# Patient Record
Sex: FEMALE | URBAN - NONMETROPOLITAN AREA
[De-identification: names, ages, dates, MRNs, and addresses within clinical notes are randomized per-mention and may not be internally consistent; named-entity substitution may affect disease eponyms.]

---

## 2018-08-08 ENCOUNTER — OFFICE VISIT (OUTPATIENT)
Dept: URGENT CARE | Age: 68
End: 2018-08-08
Payer: MEDICARE

## 2018-08-08 VITALS
TEMPERATURE: 97.4 F | HEIGHT: 64 IN | OXYGEN SATURATION: 95 % | RESPIRATION RATE: 20 BRPM | BODY MASS INDEX: 28.51 KG/M2 | WEIGHT: 167 LBS | HEART RATE: 71 BPM | SYSTOLIC BLOOD PRESSURE: 168 MMHG | DIASTOLIC BLOOD PRESSURE: 71 MMHG

## 2018-08-08 DIAGNOSIS — L02.413 CUTANEOUS ABSCESS OF RIGHT UPPER EXTREMITY: Primary | ICD-10-CM

## 2018-08-08 PROCEDURE — 4040F PNEUMOC VAC/ADMIN/RCVD: CPT | Performed by: PHYSICIAN ASSISTANT

## 2018-08-08 PROCEDURE — 1123F ACP DISCUSS/DSCN MKR DOCD: CPT | Performed by: PHYSICIAN ASSISTANT

## 2018-08-08 PROCEDURE — 4004F PT TOBACCO SCREEN RCVD TLK: CPT | Performed by: PHYSICIAN ASSISTANT

## 2018-08-08 PROCEDURE — G8419 CALC BMI OUT NRM PARAM NOF/U: HCPCS | Performed by: PHYSICIAN ASSISTANT

## 2018-08-08 PROCEDURE — 1090F PRES/ABSN URINE INCON ASSESS: CPT | Performed by: PHYSICIAN ASSISTANT

## 2018-08-08 PROCEDURE — G8427 DOCREV CUR MEDS BY ELIG CLIN: HCPCS | Performed by: PHYSICIAN ASSISTANT

## 2018-08-08 PROCEDURE — 1101F PT FALLS ASSESS-DOCD LE1/YR: CPT | Performed by: PHYSICIAN ASSISTANT

## 2018-08-08 PROCEDURE — G8400 PT W/DXA NO RESULTS DOC: HCPCS | Performed by: PHYSICIAN ASSISTANT

## 2018-08-08 PROCEDURE — 3017F COLORECTAL CA SCREEN DOC REV: CPT | Performed by: PHYSICIAN ASSISTANT

## 2018-08-08 PROCEDURE — 99213 OFFICE O/P EST LOW 20 MIN: CPT | Performed by: PHYSICIAN ASSISTANT

## 2018-08-08 RX ORDER — CEPHALEXIN 500 MG/1
500 CAPSULE ORAL 2 TIMES DAILY
Qty: 20 CAPSULE | Refills: 0 | Status: SHIPPED | OUTPATIENT
Start: 2018-08-08 | End: 2018-08-18

## 2018-08-08 RX ORDER — OMEPRAZOLE 40 MG/1
40 CAPSULE, DELAYED RELEASE ORAL
COMMUNITY
Start: 2018-06-27

## 2018-08-08 NOTE — PROGRESS NOTES
Subjective:      Patient ID: Dario Pope is a 77 y.o. female. HPI    [place] Keon presents today with a possible insect bite on posterior right shoulder. First noticed 2 days ago. Yesterday was draining. No fever noted. Area is irritated. No other associated symptoms. Applied Neosporin first day that it was noticed. No medications since. Review of Systems   Constitutional: Negative for chills and fever. Skin: Positive for wound. All other systems reviewed and are negative. Objective:   Physical Exam   Constitutional: She is oriented to person, place, and time. She appears well-developed and well-nourished. No distress. HENT:   Head: Normocephalic and atraumatic. Eyes: Right eye exhibits no discharge. Left eye exhibits no discharge. Neck: Normal range of motion. Neck supple. Pulmonary/Chest: Effort normal. No respiratory distress. Lymphadenopathy:     She has no cervical adenopathy. Neurological: She is alert and oriented to person, place, and time. Skin: Skin is warm and dry. She is not diaphoretic. Approximately 1.5 cm pustule to posterior right upper arm. No drainage noted. Non-fluctuant   Psychiatric: She has a normal mood and affect. Her behavior is normal. Judgment and thought content normal.   Nursing note and vitals reviewed. Assessment:      Skin Abscess      Plan:      - Start Keflex today. Take 1 tablet by mouth twice daily for 10 days.   - May do warm soaks.   - Notify clinic with any change in or worsening of symptoms.   - Return as needed.            Rowan Altamirano PA-C

## 2018-09-16 ENCOUNTER — OFFICE VISIT (OUTPATIENT)
Dept: URGENT CARE | Age: 68
End: 2018-09-16
Payer: MEDICARE

## 2018-09-16 VITALS
SYSTOLIC BLOOD PRESSURE: 146 MMHG | HEART RATE: 72 BPM | WEIGHT: 161 LBS | DIASTOLIC BLOOD PRESSURE: 72 MMHG | HEIGHT: 64 IN | BODY MASS INDEX: 27.49 KG/M2 | RESPIRATION RATE: 18 BRPM | TEMPERATURE: 97.5 F | OXYGEN SATURATION: 94 %

## 2018-09-16 DIAGNOSIS — N30.90 CYSTITIS: ICD-10-CM

## 2018-09-16 DIAGNOSIS — R30.0 DYSURIA: Primary | ICD-10-CM

## 2018-09-16 DIAGNOSIS — B37.31 YEAST VAGINITIS: ICD-10-CM

## 2018-09-16 LAB
APPEARANCE FLUID: ABNORMAL
BILIRUBIN, POC: ABNORMAL
BLOOD URINE, POC: ABNORMAL
CLARITY, POC: ABNORMAL
COLOR, POC: ABNORMAL
GLUCOSE URINE, POC: NEGATIVE
KETONES, POC: ABNORMAL
LEUKOCYTE EST, POC: ABNORMAL
NITRITE, POC: NEGATIVE
PH, POC: 5.5
PROTEIN, POC: ABNORMAL
SPECIFIC GRAVITY, POC: 1.02
UROBILINOGEN, POC: ABNORMAL

## 2018-09-16 PROCEDURE — 1101F PT FALLS ASSESS-DOCD LE1/YR: CPT | Performed by: FAMILY MEDICINE

## 2018-09-16 PROCEDURE — 1123F ACP DISCUSS/DSCN MKR DOCD: CPT | Performed by: FAMILY MEDICINE

## 2018-09-16 PROCEDURE — G8400 PT W/DXA NO RESULTS DOC: HCPCS | Performed by: FAMILY MEDICINE

## 2018-09-16 PROCEDURE — G8419 CALC BMI OUT NRM PARAM NOF/U: HCPCS | Performed by: FAMILY MEDICINE

## 2018-09-16 PROCEDURE — 81002 URINALYSIS NONAUTO W/O SCOPE: CPT | Performed by: FAMILY MEDICINE

## 2018-09-16 PROCEDURE — 4004F PT TOBACCO SCREEN RCVD TLK: CPT | Performed by: FAMILY MEDICINE

## 2018-09-16 PROCEDURE — G8427 DOCREV CUR MEDS BY ELIG CLIN: HCPCS | Performed by: FAMILY MEDICINE

## 2018-09-16 PROCEDURE — 4040F PNEUMOC VAC/ADMIN/RCVD: CPT | Performed by: FAMILY MEDICINE

## 2018-09-16 PROCEDURE — 99213 OFFICE O/P EST LOW 20 MIN: CPT | Performed by: FAMILY MEDICINE

## 2018-09-16 PROCEDURE — 3017F COLORECTAL CA SCREEN DOC REV: CPT | Performed by: FAMILY MEDICINE

## 2018-09-16 PROCEDURE — 1090F PRES/ABSN URINE INCON ASSESS: CPT | Performed by: FAMILY MEDICINE

## 2018-09-16 RX ORDER — FLUCONAZOLE 100 MG/1
100 TABLET ORAL DAILY
Qty: 7 TABLET | Refills: 0 | Status: SHIPPED | OUTPATIENT
Start: 2018-09-16 | End: 2018-09-23

## 2018-09-16 RX ORDER — SULFAMETHOXAZOLE AND TRIMETHOPRIM 800; 160 MG/1; MG/1
1 TABLET ORAL 2 TIMES DAILY
Qty: 20 TABLET | Refills: 0 | Status: SHIPPED | OUTPATIENT
Start: 2018-09-16 | End: 2018-09-26

## 2018-09-16 ASSESSMENT — ENCOUNTER SYMPTOMS: NAUSEA: 0

## 2018-09-16 NOTE — PROGRESS NOTES
[address] [facility]  [address] [facility] [place] [address]  Dept: [phone]  Loc: [phone]    Farida Li is a 77 y.o. female who presents today for her medical conditions/complaints as noted below. Farida Li is c/o of Urinary Frequency and Dysuria        HPI:     Urinary Frequency    This is a new problem. The current episode started yesterday. The problem has been rapidly worsening. There has been no fever. Associated symptoms include frequency, hesitancy and urgency. Pertinent negatives include no discharge, flank pain, hematuria, nausea or possible pregnancy. Her past medical history is significant for recurrent UTIs. Dysuria    This is a new problem. The current episode started yesterday. The problem has been rapidly worsening. Associated symptoms include frequency, hesitancy and urgency. Pertinent negatives include no discharge, flank pain, hematuria, nausea or possible pregnancy. Her past medical history is significant for recurrent UTIs. Past Medical History:   Diagnosis Date    Diabetes mellitus ([address]     Hyperlipidemia     Hypertension       Past Surgical History:   Procedure Laterality Date    CYST REMOVAL Right     HYSTERECTOMY         History reviewed. No pertinent family history.     Social History   Substance Use Topics    Smoking status: Current Every Day Smoker     Packs/day: 0.50     Types: Cigarettes    Smokeless tobacco: Never Used    Alcohol use No      Current Outpatient Prescriptions   Medication Sig Dispense Refill    fluconazole (DIFLUCAN) 100 MG tablet Take 1 tablet by mouth daily for 7 days 7 tablet 0    sulfamethoxazole-trimethoprim (BACTRIM DS) 800-160 MG per tablet Take 1 tablet by mouth 2 times daily for 10 days 20 tablet 0    Dulaglutide (TRULICITY) 7.60 BP/9.3PA SOPN Inject into the skin      Valsartan (DIOVAN PO) Diovan      omeprazole (PRILOSEC) 40 MG delayed release capsule Take 40 mg by mouth      hydrALAZINE (APRESOLINE) 10 MG tablet Take 10 mg by mouth 3 times daily      metoprolol (LOPRESSOR) 100 MG tablet Take 100 mg by mouth 2 times daily      GLYBURIDE PO Take by mouth      meclizine (ANTIVERT) 12.5 MG tablet Take 12.5 mg by mouth 3 times daily as needed      HYDROcodone-acetaminophen (NORCO)  MG per tablet Take 1 tablet by mouth every 6 hours as needed for Pain      furosemide (LASIX) 40 MG tablet Take 40 mg by mouth 2 times daily      phenazopyridine (PYRIDIUM) 200 MG tablet Take 1 tablet by mouth 3 times daily as needed for Pain 6 tablet 0     No current facility-administered medications for this visit. Allergies   Allergen Reactions    Codeine     Morphine     Paxil [Paroxetine Hcl]     Propoxyphene        Health Maintenance   Topic Date Due    Potassium monitoring  1950    Creatinine monitoring  1950    Hepatitis C screen  1950    DTaP/Tdap/Td vaccine (1 - Tdap) 05/26/1967    Lipid screen  05/26/1988    Diabetes screen  05/26/1988    Breast cancer screen  05/26/1998    Shingles Vaccine (1 of 2 - 2 Dose Series) 05/26/1998    Colon cancer screen colonoscopy  05/26/1998    DEXA (modify frequency per FRAX score)  05/26/2013    Pneumococcal low/med risk (2 of 2 - PPSV23) 11/17/2016    Flu vaccine (1) 09/01/2018       Subjective:      Review of Systems   Gastrointestinal: Negative for nausea. Genitourinary: Positive for dysuria, frequency, hesitancy and urgency. Negative for flank pain and hematuria. Objective:     Physical Exam   Constitutional: She is oriented to person, place, and time. She appears well-developed and well-nourished. No distress. HENT:   Head: Normocephalic and atraumatic. Cardiovascular: Normal rate. Pulmonary/Chest: Effort normal.   Abdominal: Soft. Bowel sounds are normal. She exhibits no distension. There is no tenderness. Neurological: She is alert and oriented to person, place, and time.    Skin: She is not diaphoretic. Nursing note and vitals reviewed. BP (!) 146/72   Pulse 72   Temp 97.5 °F (36.4 °C) (Oral)   Resp 18   Ht 5' 3.5\" (1.613 m)   Wt 161 lb (73 kg)   SpO2 94%   BMI 28.07 kg/m²     Assessment:       Diagnosis Orders   1. Dysuria  POCT Urinalysis no Micro    Urine Culture   2. Cystitis  sulfamethoxazole-trimethoprim (BACTRIM DS) 800-160 MG per tablet   3. Yeast vaginitis  fluconazole (DIFLUCAN) 100 MG tablet       Plan:      Orders Placed This Encounter   Procedures    Urine Culture     Order Specific Question:   Specify (ex-cath, midstream, cysto, etc)? Answer:   mid-stream    POCT Urinalysis no Micro       No Follow-up on file. Orders Placed This Encounter   Procedures    Urine Culture     Order Specific Question:   Specify (ex-cath, midstream, cysto, etc)? Answer:   mid-stream    POCT Urinalysis no Micro     Orders Placed This Encounter   Medications    fluconazole (DIFLUCAN) 100 MG tablet     Sig: Take 1 tablet by mouth daily for 7 days     Dispense:  7 tablet     Refill:  0    sulfamethoxazole-trimethoprim (BACTRIM DS) 800-160 MG per tablet     Sig: Take 1 tablet by mouth 2 times daily for 10 days     Dispense:  20 tablet     Refill:  0       Patient given educational materials - see patient instructions. Discussed use, benefit, and side effects of prescribed medications. All patient questions answered. Pt voiced understanding. Reviewed health maintenance. Instructed to continue current medications, diet and exercise. Patient agreed with treatment plan. Follow up as directed. Patient Instructions   Take medicines as directed. Drink plenty of liquids. Return for problems.         Electronically signed by Celia Mackenzie MD on 9/16/2018 at 2:12 PM

## 2018-09-19 ENCOUNTER — TELEPHONE (OUTPATIENT)
Dept: URGENT CARE | Age: 68
End: 2018-09-19

## 2018-09-19 LAB
ORGANISM: ABNORMAL
ORGANISM: ABNORMAL
URINE CULTURE, ROUTINE: ABNORMAL

## 2018-09-19 NOTE — TELEPHONE ENCOUNTER
Please inform patient that her urine culture grew e coli bacteria and klebsiella. She was started on bactrim which is sensitive, there is no further treatment necessary. Please have patient finish full course of antibiotics and follow up with PCP as needed.  Thanks

## 2018-12-15 ENCOUNTER — OFFICE VISIT (OUTPATIENT)
Dept: URGENT CARE | Age: 68
End: 2018-12-15
Payer: MEDICARE

## 2018-12-15 VITALS
SYSTOLIC BLOOD PRESSURE: 130 MMHG | BODY MASS INDEX: 27.38 KG/M2 | RESPIRATION RATE: 20 BRPM | WEIGHT: 157 LBS | DIASTOLIC BLOOD PRESSURE: 80 MMHG | TEMPERATURE: 97.7 F | OXYGEN SATURATION: 93 % | HEART RATE: 68 BPM

## 2018-12-15 DIAGNOSIS — R05.9 COUGH: ICD-10-CM

## 2018-12-15 DIAGNOSIS — Z72.0 TOBACCO ABUSE: ICD-10-CM

## 2018-12-15 DIAGNOSIS — J40 BRONCHITIS: Primary | ICD-10-CM

## 2018-12-15 PROCEDURE — 4040F PNEUMOC VAC/ADMIN/RCVD: CPT | Performed by: NURSE PRACTITIONER

## 2018-12-15 PROCEDURE — G8484 FLU IMMUNIZE NO ADMIN: HCPCS | Performed by: NURSE PRACTITIONER

## 2018-12-15 PROCEDURE — 4004F PT TOBACCO SCREEN RCVD TLK: CPT | Performed by: NURSE PRACTITIONER

## 2018-12-15 PROCEDURE — 3017F COLORECTAL CA SCREEN DOC REV: CPT | Performed by: NURSE PRACTITIONER

## 2018-12-15 PROCEDURE — 1101F PT FALLS ASSESS-DOCD LE1/YR: CPT | Performed by: NURSE PRACTITIONER

## 2018-12-15 PROCEDURE — G8419 CALC BMI OUT NRM PARAM NOF/U: HCPCS | Performed by: NURSE PRACTITIONER

## 2018-12-15 PROCEDURE — G8400 PT W/DXA NO RESULTS DOC: HCPCS | Performed by: NURSE PRACTITIONER

## 2018-12-15 PROCEDURE — 1123F ACP DISCUSS/DSCN MKR DOCD: CPT | Performed by: NURSE PRACTITIONER

## 2018-12-15 PROCEDURE — 1090F PRES/ABSN URINE INCON ASSESS: CPT | Performed by: NURSE PRACTITIONER

## 2018-12-15 PROCEDURE — G8427 DOCREV CUR MEDS BY ELIG CLIN: HCPCS | Performed by: NURSE PRACTITIONER

## 2018-12-15 PROCEDURE — 99213 OFFICE O/P EST LOW 20 MIN: CPT | Performed by: NURSE PRACTITIONER

## 2018-12-15 RX ORDER — DOXYCYCLINE HYCLATE 100 MG/1
100 CAPSULE ORAL 2 TIMES DAILY
Qty: 20 CAPSULE | Refills: 0 | Status: SHIPPED | OUTPATIENT
Start: 2018-12-15 | End: 2018-12-25

## 2018-12-15 RX ORDER — BENZONATATE 200 MG/1
200 CAPSULE ORAL 3 TIMES DAILY PRN
Qty: 30 CAPSULE | Refills: 0 | Status: SHIPPED | OUTPATIENT
Start: 2018-12-15 | End: 2019-04-14

## 2018-12-15 RX ORDER — METHYLPREDNISOLONE 4 MG/1
TABLET ORAL
Qty: 1 KIT | Refills: 0 | Status: SHIPPED | OUTPATIENT
Start: 2018-12-15 | End: 2018-12-21

## 2018-12-15 ASSESSMENT — ENCOUNTER SYMPTOMS
DIARRHEA: 0
VOMITING: 0
BLOOD IN STOOL: 0
SORE THROAT: 0
NAUSEA: 0
EYE PAIN: 0
ABDOMINAL PAIN: 0
CONSTIPATION: 0
WHEEZING: 0
EYE DISCHARGE: 0
PHOTOPHOBIA: 0
COUGH: 1
SHORTNESS OF BREATH: 0

## 2018-12-15 NOTE — PROGRESS NOTES
noted.   Nursing note and vitals reviewed. Assessment:       Diagnosis Orders   1. Bronchitis  doxycycline hyclate (VIBRAMYCIN) 100 MG capsule    methylPREDNISolone (MEDROL DOSEPACK) 4 MG tablet   2. Tobacco abuse  doxycycline hyclate (VIBRAMYCIN) 100 MG capsule   3. Cough  methylPREDNISolone (MEDROL DOSEPACK) 4 MG tablet    benzonatate (TESSALON) 200 MG capsule         Plan:      Current Outpatient Rx   Medication Sig Dispense Refill    doxycycline hyclate (VIBRAMYCIN) 100 MG capsule Take 1 capsule by mouth 2 times daily for 10 days 20 capsule 0    methylPREDNISolone (MEDROL DOSEPACK) 4 MG tablet Take by mouth. 1 kit 0    benzonatate (TESSALON) 200 MG capsule Take 1 capsule by mouth 3 times daily as needed for Cough 30 capsule 0    Valsartan (DIOVAN PO) Diovan      omeprazole (PRILOSEC) 40 MG delayed release capsule Take 40 mg by mouth      metoprolol (LOPRESSOR) 100 MG tablet Take 100 mg by mouth 2 times daily      furosemide (LASIX) 40 MG tablet Take 40 mg by mouth 2 times daily      Dulaglutide (TRULICITY) 8.46 WI/9.5VF SOPN Inject into the skin      hydrALAZINE (APRESOLINE) 10 MG tablet Take 10 mg by mouth 3 times daily      GLYBURIDE PO Take by mouth      meclizine (ANTIVERT) 12.5 MG tablet Take 12.5 mg by mouth 3 times daily as needed      HYDROcodone-acetaminophen (NORCO)  MG per tablet Take 1 tablet by mouth every 6 hours as needed for Pain      phenazopyridine (PYRIDIUM) 200 MG tablet Take 1 tablet by mouth 3 times daily as needed for Pain 6 tablet 0     No orders of the defined types were placed in this encounter. Return if symptoms worsen or fail to improve. There are no Patient Instructions on file for this visit. ICD-10-CM    1. Bronchitis J40 doxycycline hyclate (VIBRAMYCIN) 100 MG capsule     methylPREDNISolone (MEDROL DOSEPACK) 4 MG tablet   2. Tobacco abuse Z72.0 doxycycline hyclate (VIBRAMYCIN) 100 MG capsule   3.  Cough R05 methylPREDNISolone (MEDROL DOSEPACK) 4 MG

## 2019-04-14 ENCOUNTER — OFFICE VISIT (OUTPATIENT)
Dept: URGENT CARE | Age: 69
End: 2019-04-14
Payer: MEDICARE

## 2019-04-14 VITALS
TEMPERATURE: 98 F | OXYGEN SATURATION: 96 % | RESPIRATION RATE: 18 BRPM | HEIGHT: 64 IN | DIASTOLIC BLOOD PRESSURE: 69 MMHG | SYSTOLIC BLOOD PRESSURE: 169 MMHG | BODY MASS INDEX: 26.46 KG/M2 | WEIGHT: 155 LBS | HEART RATE: 65 BPM
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
## 2019-04-14 PROCEDURE — G8419 CALC BMI OUT NRM PARAM NOF/U: HCPCS | Performed by: NURSE PRACTITIONER

## 2019-04-14 PROCEDURE — G8400 PT W/DXA NO RESULTS DOC: HCPCS | Performed by: NURSE PRACTITIONER

## 2019-04-14 PROCEDURE — 1090F PRES/ABSN URINE INCON ASSESS: CPT | Performed by: NURSE PRACTITIONER

## 2019-04-14 PROCEDURE — 1123F ACP DISCUSS/DSCN MKR DOCD: CPT | Performed by: NURSE PRACTITIONER

## 2019-04-14 PROCEDURE — 4004F PT TOBACCO SCREEN RCVD TLK: CPT | Performed by: NURSE PRACTITIONER

## 2019-04-14 PROCEDURE — 3017F COLORECTAL CA SCREEN DOC REV: CPT | Performed by: NURSE PRACTITIONER

## 2019-04-14 PROCEDURE — 4040F PNEUMOC VAC/ADMIN/RCVD: CPT | Performed by: NURSE PRACTITIONER

## 2019-04-14 PROCEDURE — 99213 OFFICE O/P EST LOW 20 MIN: CPT | Performed by: NURSE PRACTITIONER

## 2019-04-14 PROCEDURE — G8427 DOCREV CUR MEDS BY ELIG CLIN: HCPCS | Performed by: NURSE PRACTITIONER

## 2019-04-14 RX ORDER — TRIAMCINOLONE ACETONIDE 1 MG/G
CREAM TOPICAL
Qty: 80 G | Refills: 1 | Status: SHIPPED | OUTPATIENT
Start: 2019-04-14

## 2019-04-14 RX ORDER — VALSARTAN AND HYDROCHLOROTHIAZIDE 160; 12.5 MG/1; MG/1
1 TABLET, FILM COATED ORAL DAILY
COMMUNITY
Start: 2019-03-14

## 2019-04-14 ASSESSMENT — ENCOUNTER SYMPTOMS
SORE THROAT: 0
EYE REDNESS: 0
EYE DISCHARGE: 0
WHEEZING: 0
DIARRHEA: 0
VOMITING: 0
COUGH: 0

## 2019-04-14 NOTE — PATIENT INSTRUCTIONS
Patient Education        Insect Stings and Bites: Care Instructions  Your Care Instructions  Stings and bites from bees, wasps, ants, and other insects often cause pain, swelling, redness, and itching. In some people, especially children, the redness and swelling may be worse. It may extend several inches beyond the affected area. But in most cases, stings and bites don't cause reactions all over the body. If you have had a reaction to an insect sting or bite, you are at risk for a reaction if you get stung or bitten again. Follow-up care is a key part of your treatment and safety. Be sure to make and go to all appointments, and call your doctor if you are having problems. It's also a good idea to know your test results and keep a list of the medicines you take. How can you care for yourself at home? · Do not scratch or rub the skin where the sting or bite occurred. · Put a cold pack or ice cube on the area. Put a thin cloth between the ice and your skin. For some people, a paste of baking soda mixed with a little water helps relieve pain and decrease the reaction. · Take an over-the-counter antihistamine, such as diphenhydramine (Benadryl) or loratadine (Claritin), to relieve swelling, redness, and itching. Calamine lotion or hydrocortisone cream may also help. Do not give antihistamines to your child unless you have checked with the doctor first.  · Be safe with medicines. If your doctor prescribed medicine for your allergy, take it exactly as prescribed. Call your doctor if you think you are having a problem with your medicine. You will get more details on the specific medicines your doctor prescribes. · Your doctor may prescribe a shot of epinephrine to carry with you in case you have a severe reaction. Learn how and when to give yourself the shot, and keep it with you at all times. Make sure it has not . · Go to the emergency room anytime you have a severe reaction.  Go even if you have given yourself epinephrine and are feeling better. Symptoms can come back. When should you call for help? Call 911 anytime you think you may need emergency care. For example, call if:    · You have symptoms of a severe allergic reaction. These may include:  ? Sudden raised, red areas (hives) all over your body. ? Swelling of the throat, mouth, lips, or tongue. ? Trouble breathing. ? Passing out (losing consciousness). Or you may feel very lightheaded or suddenly feel weak, confused, or restless.    Call your doctor now or seek immediate medical care if:    · You have symptoms of an allergic reaction not right at the sting or bite, such as:  ? A rash or small area of hives (raised, red areas on the skin). ? Itching. ? Swelling. ? Belly pain, nausea, or vomiting.     · You have a lot of swelling around the site (such as your entire arm or leg is swollen).     · You have signs of infection, such as:  ? Increased pain, swelling, redness, or warmth around the sting. ? Red streaks leading from the area. ? Pus draining from the sting. ? A fever.    Watch closely for changes in your health, and be sure to contact your doctor if:    · You do not get better as expected. Where can you learn more? Go to [email or web address] and sign in to your [employer] account. Enter P390 in the [facility] box to learn more about \"Insect Stings and Bites: Care Instructions. \"     If you do not have an account, please click on the \"Sign Up Now\" link. Current as of: September 23, 2018  Content Version: 11.9  © 3183-6873 [employer]. Care instructions adapted under license by [facility] ([facility]). If you have questions about a medical condition or this instruction, always ask your healthcare professional. [place] [address] any warranty or liability for your use of this information. 1. Keep skin on lower legs clean and dry. 2. Apply Kenalog cream to lower legs twice a day.   3. If not improving, recheck with PCP, Dr. Amaris [place].

## 2019-04-14 NOTE — PROGRESS NOTES
[address] [facility]  [address] [facility] [address]  Dept: [phone]  Loc: Shashank England is a 77 y.o. female who presents today for her medical conditions/complaintsas noted below. Ryder Waddell is c/o of Insect Bite (Patient was in [place] and now has red areas on legs, arms, and chest)        HPI:     HPI  Patient reports that she got multiple insect bites on her lower legs while she was in [place]. They are very itchy. She also has some insect bites on her upper arms. She has not tried any treatments. Past Medical History:   Diagnosis Date    Diabetes mellitus ([address]     Hyperlipidemia     Hypertension       Past Surgical History:   Procedure Laterality Date    CYST REMOVAL Right     HYSTERECTOMY         History reviewed. No pertinent family history. Social History     Tobacco Use    Smoking status: Current Every Day Smoker     Packs/day: 0.50     Types: Cigarettes    Smokeless tobacco: Never Used   Substance Use Topics    Alcohol use: No     Alcohol/week: 0.0 oz      Current Outpatient Medications   Medication Sig Dispense Refill    valsartan-hydrochlorothiazide (DIOVAN-HCT) 160-12.5 MG per tablet Take 1 tablet by mouth daily      triamcinolone (KENALOG) 0.1 % cream Apply topically 2 times daily. 80 g 1    Dulaglutide (TRULICITY) 3.65 UF/3.3SK SOPN Inject into the skin      omeprazole (PRILOSEC) 40 MG delayed release capsule Take 40 mg by mouth      metoprolol (LOPRESSOR) 100 MG tablet Take 100 mg by mouth 2 times daily      furosemide (LASIX) 40 MG tablet Take 40 mg by mouth as needed        No current facility-administered medications for this visit.       Allergies   Allergen Reactions    Codeine     Morphine     Paxil [Paroxetine Hcl]     Propoxyphene        Health Maintenance   Topic Date Due    Potassium monitoring  1948    Creatinine monitoring  1948    Hepatitis C screen 1950    DTaP/Tdap/Td vaccine (1 - Tdap) 05/26/1967    Lipid screen  05/26/1988    Diabetes screen  05/26/1988    Breast cancer screen  05/26/1998    Shingles Vaccine (1 of 2) 05/26/1998    Colon cancer screen colonoscopy  05/26/1998    DEXA (modify frequency per FRAX score)  05/26/2013    Pneumococcal 65+ years Vaccine (2 of 2 - PPSV23) 05/26/2013    Flu vaccine (Season Ended) 09/01/2019       Subjective:     Review of Systems   Constitutional: Negative for activity change and fever. HENT: Negative for congestion and sore throat. Eyes: Negative for discharge and redness. Respiratory: Negative for cough and wheezing. Gastrointestinal: Negative for diarrhea and vomiting. Skin: Positive for rash. Objective:     Physical Exam   Constitutional: She is oriented to person, place, and time. Vital signs are normal. She appears well-developed and well-nourished. She is cooperative. HENT:   Head: Normocephalic and atraumatic. Right Ear: Hearing, tympanic membrane, external ear and ear canal normal.   Left Ear: Hearing, tympanic membrane, external ear and ear canal normal.   Nose: Nose normal. No rhinorrhea. Mouth/Throat: Uvula is midline and oropharynx is clear and moist.   Eyes: Pupils are equal, round, and reactive to light. Conjunctivae are normal. Right eye exhibits no discharge. Left eye exhibits no discharge. Neck: Normal range of motion. Neck supple. Cardiovascular: Normal rate, regular rhythm and normal heart sounds. Pulmonary/Chest: Effort normal and breath sounds normal.   Musculoskeletal: Normal range of motion. Neurological: She is alert and oriented to person, place, and time. Skin: Skin is warm. Rash noted. Rash is urticarial.        Multiple small flat lesions, no drainage. Patient reports very itchy. Psychiatric: She has a normal mood and affect.  Her behavior is normal. Judgment and thought content normal.     BP (!) 169/69 (Site: Right Upper Arm)   Pulse 65 Temp 98 °F (36.7 °C)   Resp 18   Ht 5' 3.5\" (1.613 m)   Wt 155 lb (70.3 kg)   SpO2 96%   BMI 27.03 kg/m²     Assessment:          Diagnosis Orders   1. Insect bite, initial encounter         Plan:    No orders of the defined types were placed in this encounter. Return if symptoms worsen or fail to improve. No orders of the defined types were placed in this encounter. Orders Placed This Encounter   Medications    triamcinolone (KENALOG) 0.1 % cream     Sig: Apply topically 2 times daily. Dispense:  80 g     Refill:  1       Patient given educationalmaterials - see patient instructions. Discussed use, benefit, and side effectsof prescribed medications. All patient questions answered. Pt voiced understanding. Reviewed health maintenance. Instructed to continue current medications, diet andexercise. Patient agreed with treatment plan. Follow up as directed. Patient Instructions       Patient Education        Insect Stings and Bites: Care Instructions  Your Care Instructions  Stings and bites from bees, wasps, ants, and other insects often cause pain, swelling, redness, and itching. In some people, especially children, the redness and swelling may be worse. It may extend several inches beyond the affected area. But in most cases, stings and bites don't cause reactions all over the body. If you have had a reaction to an insect sting or bite, you are at risk for a reaction if you get stung or bitten again. Follow-up care is a key part of your treatment and safety. Be sure to make and go to all appointments, and call your doctor if you are having problems. It's also a good idea to know your test results and keep a list of the medicines you take. How can you care for yourself at home? · Do not scratch or rub the skin where the sting or bite occurred. · Put a cold pack or ice cube on the area. Put a thin cloth between the ice and your skin.  For some people, a paste of baking soda mixed with a little water helps relieve pain and decrease the reaction. · Take an over-the-counter antihistamine, such as diphenhydramine (Benadryl) or loratadine (Claritin), to relieve swelling, redness, and itching. Calamine lotion or hydrocortisone cream may also help. Do not give antihistamines to your child unless you have checked with the doctor first.  · Be safe with medicines. If your doctor prescribed medicine for your allergy, take it exactly as prescribed. Call your doctor if you think you are having a problem with your medicine. You will get more details on the specific medicines your doctor prescribes. · Your doctor may prescribe a shot of epinephrine to carry with you in case you have a severe reaction. Learn how and when to give yourself the shot, and keep it with you at all times. Make sure it has not . · Go to the emergency room anytime you have a severe reaction. Go even if you have given yourself epinephrine and are feeling better. Symptoms can come back. When should you call for help? Call 911 anytime you think you may need emergency care. For example, call if:    · You have symptoms of a severe allergic reaction. These may include:  ? Sudden raised, red areas (hives) all over your body. ? Swelling of the throat, mouth, lips, or tongue. ? Trouble breathing. ? Passing out (losing consciousness). Or you may feel very lightheaded or suddenly feel weak, confused, or restless.    Call your doctor now or seek immediate medical care if:    · You have symptoms of an allergic reaction not right at the sting or bite, such as:  ? A rash or small area of hives (raised, red areas on the skin). ? Itching. ? Swelling. ? Belly pain, nausea, or vomiting.     · You have a lot of swelling around the site (such as your entire arm or leg is swollen).     · You have signs of infection, such as:  ? Increased pain, swelling, redness, or warmth around the sting. ? Red streaks leading from the area. ?  Pus

## 2019-12-28 ENCOUNTER — IMMUNIZATION (OUTPATIENT)
Dept: URGENT CARE | Age: 69
End: 2019-12-28

## 2019-12-28 DIAGNOSIS — Z53.21 PROCEDURE AND TREATMENT NOT CARRIED OUT DUE TO PATIENT LEAVING PRIOR TO BEING SEEN BY HEALTH CARE PROVIDER: Primary | ICD-10-CM

## 2021-02-22 ENCOUNTER — HOSPITAL ENCOUNTER (OUTPATIENT)
Dept: GENERAL RADIOLOGY | Facility: HOSPITAL | Age: 71
Discharge: HOME | End: 2021-02-22
Admitting: FAMILY MEDICINE

## 2021-02-22 PROCEDURE — 71101 X-RAY EXAM UNILAT RIBS/CHEST: CPT

## 2021-03-01 ENCOUNTER — TELEPHONE (OUTPATIENT)
Dept: URGENT CARE | Facility: CLINIC | Age: 71
End: 2021-03-01

## 2021-03-01 NOTE — TELEPHONE ENCOUNTER
Patient called wanting to know if she could get her COVID vaccine this afternoon. It is her first one. She has a PCP in [place] and is visiting family here for a few weeks. I advised her that she really should call Dr. Reyes and ask his thoughts to be sure. My thought would be to at least wait one more week since she has three rib fractures and pneumonia.    I did ask her how she was doing and she states she is improving. Her breathing is better, her cough is better. She states she was intending to come back here for recheck of the pneumonia since she is not going to be back to [place] for at least another few weeks. I told her as long as she was improving a recheck in about one week would be good.

## 2021-03-08 ENCOUNTER — HOSPITAL ENCOUNTER (OUTPATIENT)
Dept: GENERAL RADIOLOGY | Facility: HOSPITAL | Age: 71
Discharge: HOME | End: 2021-03-08
Admitting: FAMILY MEDICINE

## 2021-03-08 ENCOUNTER — IMMUNIZATION (OUTPATIENT)
Dept: VACCINE CLINIC | Facility: HOSPITAL | Age: 71
End: 2021-03-08

## 2021-03-08 PROCEDURE — 91301 HC SARSCO02 VAC 100MCG/0.5ML IM: CPT | Performed by: OBSTETRICS & GYNECOLOGY

## 2021-03-08 PROCEDURE — 71046 X-RAY EXAM CHEST 2 VIEWS: CPT

## 2021-03-08 PROCEDURE — 0011A: CPT | Performed by: OBSTETRICS & GYNECOLOGY

## 2021-04-05 ENCOUNTER — IMMUNIZATION (OUTPATIENT)
Dept: VACCINE CLINIC | Facility: HOSPITAL | Age: 71
End: 2021-04-05

## 2021-04-05 PROCEDURE — 91301 HC SARSCO02 VAC 100MCG/0.5ML IM: CPT | Performed by: OBSTETRICS & GYNECOLOGY

## 2021-04-05 PROCEDURE — 0012A: CPT | Performed by: OBSTETRICS & GYNECOLOGY

## 2021-06-16 ENCOUNTER — HOSPITAL ENCOUNTER (EMERGENCY)
Facility: HOSPITAL | Age: 71
Discharge: LEFT BEFORE DISCHARGE | End: 2021-06-16

## 2021-06-16 VITALS
OXYGEN SATURATION: 93 % | SYSTOLIC BLOOD PRESSURE: 185 MMHG | WEIGHT: 165 LBS | RESPIRATION RATE: 18 BRPM | DIASTOLIC BLOOD PRESSURE: 74 MMHG | BODY MASS INDEX: 29.23 KG/M2 | HEART RATE: 75 BPM | HEIGHT: 63 IN | TEMPERATURE: 97.6 F

## 2021-06-16 PROCEDURE — 99211 OFF/OP EST MAY X REQ PHY/QHP: CPT

## 2021-11-08 ENCOUNTER — PREP FOR SURGERY (OUTPATIENT)
Dept: OTHER | Facility: HOSPITAL | Age: 71
End: 2021-11-08

## 2021-11-08 ENCOUNTER — OFFICE VISIT (OUTPATIENT)
Dept: CARDIOLOGY | Facility: CLINIC | Age: 71
End: 2021-11-08

## 2021-11-08 VITALS
BODY MASS INDEX: 26.29 KG/M2 | OXYGEN SATURATION: 94 % | HEIGHT: 64 IN | SYSTOLIC BLOOD PRESSURE: 160 MMHG | HEART RATE: 64 BPM | DIASTOLIC BLOOD PRESSURE: 80 MMHG | WEIGHT: 154 LBS

## 2021-11-08 DIAGNOSIS — I25.10 CORONARY ARTERY DISEASE INVOLVING NATIVE CORONARY ARTERY OF NATIVE HEART WITHOUT ANGINA PECTORIS: Primary | ICD-10-CM

## 2021-11-08 DIAGNOSIS — I10 PRIMARY HYPERTENSION: ICD-10-CM

## 2021-11-08 DIAGNOSIS — R06.09 DOE (DYSPNEA ON EXERTION): Primary | ICD-10-CM

## 2021-11-08 DIAGNOSIS — E78.2 MIXED HYPERLIPIDEMIA: ICD-10-CM

## 2021-11-08 DIAGNOSIS — E11.69 TYPE 2 DIABETES MELLITUS WITH OTHER SPECIFIED COMPLICATION, UNSPECIFIED WHETHER LONG TERM INSULIN USE (HCC): ICD-10-CM

## 2021-11-08 DIAGNOSIS — I25.10 CORONARY ARTERY DISEASE INVOLVING NATIVE CORONARY ARTERY OF NATIVE HEART WITHOUT ANGINA PECTORIS: ICD-10-CM

## 2021-11-08 PROCEDURE — 93000 ELECTROCARDIOGRAM COMPLETE: CPT | Performed by: INTERNAL MEDICINE

## 2021-11-08 PROCEDURE — 99204 OFFICE O/P NEW MOD 45 MIN: CPT | Performed by: INTERNAL MEDICINE

## 2021-11-08 RX ORDER — DIPHENHYDRAMINE HCL 25 MG
25 CAPSULE ORAL ONCE
Status: CANCELLED | OUTPATIENT
Start: 2021-11-08 | End: 2021-11-08

## 2021-11-08 RX ORDER — DIPHENHYDRAMINE HCL 25 MG
25 CAPSULE ORAL NIGHTLY PRN
COMMUNITY

## 2021-11-08 RX ORDER — ASPIRIN 325 MG
325 TABLET, DELAYED RELEASE (ENTERIC COATED) ORAL DAILY
Status: CANCELLED | OUTPATIENT
Start: 2021-11-09

## 2021-11-08 RX ORDER — LOPERAMIDE HYDROCHLORIDE 2 MG/1
2 CAPSULE ORAL AS NEEDED
COMMUNITY

## 2021-11-08 RX ORDER — ASPIRIN 325 MG
325 TABLET ORAL ONCE
Status: CANCELLED | OUTPATIENT
Start: 2021-11-08 | End: 2021-11-08

## 2021-11-08 RX ORDER — LIDOCAINE HYDROCHLORIDE 10 MG/ML
0.1 INJECTION, SOLUTION EPIDURAL; INFILTRATION; INTRACAUDAL; PERINEURAL ONCE AS NEEDED
Status: CANCELLED | OUTPATIENT
Start: 2021-11-08

## 2021-11-08 RX ORDER — TIOTROPIUM BROMIDE INHALATION SPRAY 1.56 UG/1
SPRAY, METERED RESPIRATORY (INHALATION)
COMMUNITY
Start: 2021-10-31

## 2021-11-08 RX ORDER — SODIUM CHLORIDE 9 MG/ML
125 INJECTION, SOLUTION INTRAVENOUS ONCE
Status: CANCELLED | OUTPATIENT
Start: 2021-11-08 | End: 2021-11-08

## 2021-11-08 NOTE — PROGRESS NOTES
Referring Provider: Pepe Reyes MD    Reason for Consultation: abnormal stress test    Chief complaint:   Chief Complaint   Patient presents with   • NewPatient     referred by pcp Pepe Reyes MD for evaluation of CAD   • Coronary Artery Disease     pt saw Dr. Pretty in [place] who ran some test on her that showed abnormal.  her pcp referred her here at that time.  pt is a smoker and has sob which has gotten worse..pt states they want her to have a heart cath but [place] does not do them.       Subjective .     History of present illness:  Ruba Benton is a 71 y.o. yo female who presents today for consideration for cath. She underwent a recent stress test in [place] due to VAZQUEZ. This was positive for ischemia and cath was recommended. She has decided to have it done here in [place] were percutaneous intervention can be performed if necessary.  Chief Complaint   Patient presents with   • NewPatient     referred by pcp Pepe Reyes MD for evaluation of CAD   • Coronary Artery Disease     pt saw Dr. Pretty in [place] who ran some test on her that showed abnormal.  her pcp referred her here at that time.  pt is a smoker and has sob which has gotten worse..pt states they want her to have a heart cath but [place] does not do them.   .     History  Past Medical History:   Diagnosis Date   • Arthritis    • Coronary artery disease    • Diabetes mellitus (HCC)    • Hyperlipidemia    • Hypertension    • Injury of back    ,   Past Surgical History:   Procedure Laterality Date   •  SECTION     • TUMOR REMOVAL Right    ,   Family History   Problem Relation Age of Onset   • Brain cancer Mother    • Diabetes Mother    • Other Father 46        drowning accident   • Suicidality Sister    • Stroke Sister    • No Known Problems Sister    ,   Social History     Tobacco Use   • Smoking status: Current Every Day Smoker     Packs/day: 1.00     Years: 25.00     Pack years: 25.00     Types: Cigarettes   •  Smokeless tobacco: Never Used   Vaping Use   • Vaping Use: Former   • Substances: Nicotine   • Devices: Disposable, Pre-filled or refillable cartridge, Refillable tank, Pre-filled pod   Substance Use Topics   • Alcohol use: Not Currently   • Drug use: Never   ,     Medications  Current Outpatient Medications   Medication Sig Dispense Refill   • Aspirin-Caffeine (BC FAST PAIN RELIEF PO) Take  by mouth As Needed.     • atorvastatin (LIPITOR) 10 MG tablet Take 10 mg by mouth Daily.     • diphenhydrAMINE (BENADRYL) 25 mg capsule Take 25 mg by mouth At Night As Needed for Itching.     • Dulaglutide (Trulicity) 0.75 MG/0.5ML solution pen-injector Inject 0.75 mL as directed 1 (One) Time Per Week.     • furosemide (LASIX) 40 MG tablet Take 40 mg by mouth Daily.     • loperamide (Imodium A-D) 2 MG capsule Take 2 mg by mouth As Needed for Diarrhea.     • metoprolol tartrate (LOPRESSOR) 100 MG tablet Take 50 mg by mouth 2 (Two) Times a Day.     • omeprazole (priLOSEC) 20 MG capsule Take 20 mg by mouth Daily.     • Spiriva Respimat 1.25 MCG/ACT aerosol solution inhaler INHALE 2 PUFFS INTO THE LUNGS DAILY     • valsartan-hydrochlorothiazide (DIOVAN-HCT) 160-12.5 MG per tablet Take 0.5 tablets by mouth 2 (Two) Times a Day.     • cyclobenzaprine (FLEXERIL) 10 MG tablet Take 1 tablet by mouth 3 (Three) Times a Day As Needed for Muscle Spasms. 30 tablet 0     No current facility-administered medications for this visit.        Allergies:  Codeine, Morphine, Paroxetine hcl, Darvon [propoxyphene], and Lortab [hydrocodone-acetaminophen]    Review of Systems  Review of Systems   HENT: Negative for nosebleeds.    Cardiovascular: Positive for dyspnea on exertion. Negative for chest pain, claudication, leg swelling, near-syncope, orthopnea, palpitations, paroxysmal nocturnal dyspnea and syncope.   Respiratory: Positive for cough. Negative for hemoptysis and shortness of breath.    Musculoskeletal: Positive for arthritis, back pain and joint  "pain.   Gastrointestinal: Negative for melena.   Genitourinary: Negative for hematuria.       Objective     Physical Exam:  /80   Pulse 64   Ht 161.3 cm (63.5\")   Wt 69.9 kg (154 lb)   SpO2 94%   BMI 26.85 kg/m²   Vitals reviewed.   Pulmonary:      Effort: Pulmonary effort is normal.      Breath sounds: Normal breath sounds.   Cardiovascular:      Normal rate. Regular rhythm.      Murmurs: There is no murmur.   Edema:     Peripheral edema absent.         Results Review:   I reviewed the patient's new clinical results.    ECG 12 Lead    Date/Time: 11/8/2021 8:48 AM  Performed by: Antonio Wei MD  Authorized by: Antonio Wei MD   Comparison: not compared with previous ECG   Rhythm: sinus rhythm  Rate: normal  Conduction: conduction normal  Q waves: II, III and aVF    QRS axis: normal  Other findings: non-specific ST-T wave changes    Clinical impression: abnormal EKG            Conversion Encounter on 02/24/2014   Component Date Value Ref Range Status   • WBC 02/24/2014 7.5  3.2 - 9.8 x1000/uL Final   • RBC 02/24/2014 5.06  3.77 - 5.16 chantel/mm3 Final   • Hemoglobin 02/24/2014 14.9  12.0 - 15.5 gm/dl Final   • Hematocrit 02/24/2014 45.8* 35.0 - 45.0 % Final   • MCV 02/24/2014 90.5  80.0 - 98.0 fl Final   • MCH 02/24/2014 29.4  26.0 - 34.0 pg Final   • MCHC 02/24/2014 32.5  31.4 - 36.0 gm/dl Final   • RDW 02/24/2014 14.6* 11.5 - 14.5 % Final   • Platelets 02/24/2014 184  150 - 450 x1000/mm3 Final   • MPV 02/24/2014 12.6* 8.0 - 12.0 fl Final   • Neutrophil Rel % 02/24/2014 66.8  37.0 - 80.0 % Final   • Lymphocyte Rel % 02/24/2014 20.5  10.0 - 50.0 % Final   • Monocyte Rel % 02/24/2014 10.0  0.0 - 12.0 % Final   • Eosinophil Rel % 02/24/2014 1.9  0.0 - 7.0 % Final   • Basophil Rel % 02/24/2014 0.5  0.0 - 2.0 % Final   • Immature Granulocyte Rel % 02/24/2014 0.30  0.00 - 0.50 % Final   • Neutrophils Absolute 02/24/2014 5.02  2.00 - 8.60 x1000/uL Final   • Lymphocytes Absolute 02/24/2014 1.54  0.60 - 4.20 " x1000/uL Final   • Monocytes Absolute 02/24/2014 0.75  0.00 - 0.90 x1000/uL Final   • Eosinophils Absolute 02/24/2014 0.14  0.00 - 0.70 x1000/uL Final   • Basophils Absolute 02/24/2014 0.04  0.00 - 0.20 x1000/uL Final   • Immature Granulocytes Absolute 02/24/2014 0.020  0.005 - 0.022 x1000/uL Final   • Sodium 02/24/2014 138  137 - 145 mmol/L Final   • Potassium 02/24/2014 3.7  3.5 - 5.1 mmol/L Final   • Chloride 02/24/2014 95  95 - 110 mmol/L Final   • CO2 02/24/2014 34* 22 - 31 mmol/L Final   • Anion Gap 02/24/2014 9.0  5.0 - 15.0 mmol/L Final   • Glucose 02/24/2014 110* 60 - 100 mg/dl Final   • BUN 02/24/2014 23* 7 - 21 mg/dl Final   • Creatinine 02/24/2014 0.9  0.5 - 1.0 mg/dl Final   • GFR MDRD Non  02/24/2014 63  45 - 104 mL/min/1.73 sq.M Final    Comment: Invalid if creatinine is changing or the patient is on dialysis.  Use AA result if patient is -American, non AA result otherwise.     • GFR MDRD  02/24/2014 76  45 - 104 mL/min/1.73 sq.M Final   • Calcium 02/24/2014 9.1  8.4 - 10.2 mg/dl Final   • Total Protein 02/24/2014 6.5  6.3 - 8.6 gm/dl Final   • Albumin 02/24/2014 3.5  3.4 - 4.8 gm/dl Final   • Total Bilirubin 02/24/2014 0.6  0.2 - 1.3 mg/dl Final   • Alkaline Phosphatase 02/24/2014 89  38 - 126 U/L Final   • AST (SGOT) 02/24/2014 20  14 - 36 U/L Final   • ALT (SGPT) 02/24/2014 29  9 - 52 U/L Final   • Hemoglobin A1C 02/24/2014 5.6  4.0 - 5.6 %TotHgb Final   • Total Cholesterol 02/24/2014 174  0 - 199 mg/dl Final    CHOL DESIRED: < 200 MG/DL   • Triglycerides 02/24/2014 142  20 - 199 mg/dl Final    TRIG DESIRED: < 200 MG/DL   • HDL Cholesterol 02/24/2014 40* 60 - 200 mg/dl Final    HDL AVERAGE RISK: 35 - 60 MG/DL   • LDL Cholesterol  02/24/2014 106  0 - 129 mg/dl Final    Comment: DF by IF @ 02/24/2014 10:34  Calculated LDL results only valid if Triglycerides are < 400 mg/dL.  LDL DESIRED: < 130 MG/DL     • Free T4 02/24/2014 0.84  0.78 - 2.19 ng/dl Final   • TSH  02/24/2014 1.74  0.46 - 4.68 uIU/ml Final   • 25 Hydroxy, Vitamin D 02/24/2014 31  30 - 80 ng/mL Final    Comment: INTERPRETIVE INFORMATION: Vitamin D, 25-Hydroxy  This assay accurately quantifies the sum of vitamin D3,  25-hydroxy and vitamin D2, 25-hydroxy.  0-17 years:  Deficiency: less than 20 ng/mL  Optimum level: greater than or equal to 20 ng/mL*  *(Miramontes CL et al.  Pediatrics 2008; 122: 1142-52.)  18 years and older:  Deficiency: Less than 20 ng/mL  Insufficiency: 20-29 ng/mL  Optimum Level: 30-80 ng/mL  Possible Toxicity: Greater than 150 ng/mL  Performed by [employer],  [address] [place] [id number] [phone]  [email or web address], Fish Graham MD - Lab.  Director     • Creatinine, Urine 02/24/2014 132.1  mg/dl Final   • Albumin, U 02/24/2014 0.6  0.0 - 1.7 mg/dl Final   • Microalbumin/Creatinine Ratio 02/24/2014 5  0 - 30 mg/g Final       Assessment/Plan   Problem List Items Addressed This Visit        Cardiac and Vasculature    VAZQUEZ (dyspnea on exertion)    Current Assessment & Plan     Probably an anginal equivalent         Primary hypertension    Current Assessment & Plan     Appears to be poorly controlled         Mixed hyperlipidemia    Current Assessment & Plan     Lipid status unknown. Taking a statin         Coronary artery disease involving native coronary artery of native heart without angina pectoris    Current Assessment & Plan     Smoking diabetic. Very high risk for CAD. Will schedule cath given her nuclear findings and abnormal ECG.            Endocrine and Metabolic    Type 2 diabetes mellitus with other specified complication (HCC) - Primary    Current Assessment & Plan     She does not know her HgbA1c               Medical Complexity  must have 2 out of 3     Moderate Complexity Level 4           1 of the following medical problems:          []One chronic illness with mild exacerbation         [x]Two or more stable chronic illness          [x]One new problem  []One acute  illness with systemic symptoms    Complexity of Data  Reviewed (1 out of the 3 following categories)      Category 1 tests, documents, historian (must have 3 points)       [x]Review of prior external records  []Review of results of unique tests  [x]Ordering unique tests  []Assessment requires an independent historian    Category 2 Interpretation of tests   []Independent interpretation of test read by another doc    Category 3 Discuss Management/tests  []Discussion with external physician    Risk of complications and/or morbidity          [x]   Prescription Drug Management                [x]   Decision regarding elective major surgery

## 2021-11-08 NOTE — ASSESSMENT & PLAN NOTE
Smoking diabetic. Very high risk for CAD. Will schedule cath given her nuclear findings and abnormal ECG.

## 2021-11-10 ENCOUNTER — PATIENT ROUNDING (BHMG ONLY) (OUTPATIENT)
Dept: CARDIOLOGY | Facility: CLINIC | Age: 71
End: 2021-11-10

## 2021-11-10 NOTE — PROGRESS NOTES
November 10, 2021    Hello, may I speak with Ruba Benton?    My name is Sheba Chinchilla, RN      I am  with [facility]  [address], [address]  [place] [address] [phone].    Before we get started may I verify your date of birth? 1950    I am calling to officially welcome you to our practice and ask about your recent visit. Is this a good time to talk? YES     Tell me about your visit with us. What things went well?  It went great. Dr. Wei is great and I have a lot of kirill in him.       We're always looking for ways to make our patients' experiences even better. Do you have recommendations on ways we may improve?  NO    Overall were you satisfied with your first visit to our practice? YES        I appreciate you taking the time to speak with me today. Is there anything else I can do for you?  NO      Thank you, and have a great day.

## 2021-12-02 ENCOUNTER — HOSPITAL ENCOUNTER (OUTPATIENT)
Facility: HOSPITAL | Age: 71
Setting detail: HOSPITAL OUTPATIENT SURGERY
Discharge: HOME | End: 2021-12-02
Attending: INTERNAL MEDICINE | Admitting: INTERNAL MEDICINE

## 2021-12-02 VITALS
WEIGHT: 153.2 LBS | SYSTOLIC BLOOD PRESSURE: 150 MMHG | OXYGEN SATURATION: 99 % | HEART RATE: 59 BPM | RESPIRATION RATE: 20 BRPM | BODY MASS INDEX: 27.14 KG/M2 | TEMPERATURE: 96.8 F | DIASTOLIC BLOOD PRESSURE: 61 MMHG | HEIGHT: 63 IN

## 2021-12-02 DIAGNOSIS — I25.10 CORONARY ARTERY DISEASE INVOLVING NATIVE CORONARY ARTERY OF NATIVE HEART WITHOUT ANGINA PECTORIS: ICD-10-CM

## 2021-12-02 LAB
ALBUMIN SERPL-MCNC: 4.1 G/DL (ref 3.5–5.2)
ALBUMIN/GLOB SERPL: 1.4 G/DL
ALP SERPL-CCNC: 93 U/L (ref 39–117)
ALT SERPL W P-5'-P-CCNC: 9 U/L (ref 1–33)
ANION GAP SERPL CALCULATED.3IONS-SCNC: 12 MMOL/L (ref 5–15)
AST SERPL-CCNC: 16 U/L (ref 1–32)
BASOPHILS # BLD AUTO: 0.07 10*3/MM3 (ref 0–0.2)
BASOPHILS NFR BLD AUTO: 0.9 % (ref 0–1.5)
BILIRUB SERPL-MCNC: 0.7 MG/DL (ref 0–1.2)
BUN SERPL-MCNC: 23 MG/DL (ref 8–23)
BUN/CREAT SERPL: 29.9 (ref 7–25)
CALCIUM SPEC-SCNC: 9.1 MG/DL (ref 8.6–10.5)
CHLORIDE SERPL-SCNC: 100 MMOL/L (ref 98–107)
CO2 SERPL-SCNC: 30 MMOL/L (ref 22–29)
CREAT SERPL-MCNC: 0.77 MG/DL (ref 0.57–1)
DEPRECATED RDW RBC AUTO: 46.7 FL (ref 37–54)
EOSINOPHIL # BLD AUTO: 0.14 10*3/MM3 (ref 0–0.4)
EOSINOPHIL NFR BLD AUTO: 1.8 % (ref 0.3–6.2)
ERYTHROCYTE [DISTWIDTH] IN BLOOD BY AUTOMATED COUNT: 14.2 % (ref 12.3–15.4)
GFR SERPL CREATININE-BSD FRML MDRD: 73 ML/MIN/1.73
GLOBULIN UR ELPH-MCNC: 2.9 GM/DL
GLUCOSE SERPL-MCNC: 105 MG/DL (ref 65–99)
HCT VFR BLD AUTO: 49.3 % (ref 34–46.6)
HGB BLD-MCNC: 16 G/DL (ref 12–15.9)
IMM GRANULOCYTES # BLD AUTO: 0.03 10*3/MM3 (ref 0–0.05)
IMM GRANULOCYTES NFR BLD AUTO: 0.4 % (ref 0–0.5)
LYMPHOCYTES # BLD AUTO: 1.56 10*3/MM3 (ref 0.7–3.1)
LYMPHOCYTES NFR BLD AUTO: 19.9 % (ref 19.6–45.3)
MCH RBC QN AUTO: 29.4 PG (ref 26.6–33)
MCHC RBC AUTO-ENTMCNC: 32.5 G/DL (ref 31.5–35.7)
MCV RBC AUTO: 90.6 FL (ref 79–97)
MONOCYTES # BLD AUTO: 0.78 10*3/MM3 (ref 0.1–0.9)
MONOCYTES NFR BLD AUTO: 10 % (ref 5–12)
NEUTROPHILS NFR BLD AUTO: 5.25 10*3/MM3 (ref 1.7–7)
NEUTROPHILS NFR BLD AUTO: 67 % (ref 42.7–76)
NRBC BLD AUTO-RTO: 0 /100 WBC (ref 0–0.2)
PLATELET # BLD AUTO: 153 10*3/MM3 (ref 140–450)
PMV BLD AUTO: 12.3 FL (ref 6–12)
POTASSIUM SERPL-SCNC: 3.6 MMOL/L (ref 3.5–5.2)
PROT SERPL-MCNC: 7 G/DL (ref 6–8.5)
RBC # BLD AUTO: 5.44 10*6/MM3 (ref 3.77–5.28)
SODIUM SERPL-SCNC: 142 MMOL/L (ref 136–145)
WBC NRBC COR # BLD: 7.83 10*3/MM3 (ref 3.4–10.8)

## 2021-12-02 PROCEDURE — 99152 MOD SED SAME PHYS/QHP 5/>YRS: CPT | Performed by: INTERNAL MEDICINE

## 2021-12-02 PROCEDURE — 25010000002 DIPHENHYDRAMINE PER 50 MG: Performed by: INTERNAL MEDICINE

## 2021-12-02 PROCEDURE — 93458 L HRT ARTERY/VENTRICLE ANGIO: CPT | Performed by: INTERNAL MEDICINE

## 2021-12-02 PROCEDURE — C1894 INTRO/SHEATH, NON-LASER: HCPCS | Performed by: INTERNAL MEDICINE

## 2021-12-02 PROCEDURE — 25010000002 HEPARIN (PORCINE) 1000-0.9 UT/500ML-% SOLUTION: Performed by: INTERNAL MEDICINE

## 2021-12-02 PROCEDURE — 0 IOPAMIDOL PER 1 ML: Performed by: INTERNAL MEDICINE

## 2021-12-02 PROCEDURE — C1769 GUIDE WIRE: HCPCS | Performed by: INTERNAL MEDICINE

## 2021-12-02 PROCEDURE — 25010000002 HEPARIN (PORCINE) 2000-0.9 UNIT/L-% SOLUTION: Performed by: INTERNAL MEDICINE

## 2021-12-02 PROCEDURE — 25010000002 FENTANYL CITRATE (PF) 100 MCG/2ML SOLUTION: Performed by: INTERNAL MEDICINE

## 2021-12-02 PROCEDURE — 80053 COMPREHEN METABOLIC PANEL: CPT | Performed by: INTERNAL MEDICINE

## 2021-12-02 PROCEDURE — 85025 COMPLETE CBC W/AUTO DIFF WBC: CPT | Performed by: INTERNAL MEDICINE

## 2021-12-02 PROCEDURE — 25010000002 MIDAZOLAM PER 1 MG: Performed by: INTERNAL MEDICINE

## 2021-12-02 RX ORDER — ASPIRIN 325 MG
TABLET ORAL
Status: COMPLETED
Start: 2021-12-02 | End: 2021-12-02

## 2021-12-02 RX ORDER — DIPHENHYDRAMINE HYDROCHLORIDE 50 MG/ML
INJECTION INTRAMUSCULAR; INTRAVENOUS AS NEEDED
Status: DISCONTINUED | OUTPATIENT
Start: 2021-12-02 | End: 2021-12-02 | Stop reason: HOSPADM

## 2021-12-02 RX ORDER — SODIUM CHLORIDE 9 MG/ML
125 INJECTION, SOLUTION INTRAVENOUS ONCE
Status: COMPLETED | OUTPATIENT
Start: 2021-12-02 | End: 2021-12-02

## 2021-12-02 RX ORDER — METOPROLOL TARTRATE 5 MG/5ML
INJECTION INTRAVENOUS AS NEEDED
Status: DISCONTINUED | OUTPATIENT
Start: 2021-12-02 | End: 2021-12-02 | Stop reason: HOSPADM

## 2021-12-02 RX ORDER — HEPARIN SODIUM 200 [USP'U]/100ML
INJECTION, SOLUTION INTRAVENOUS AS NEEDED
Status: DISCONTINUED | OUTPATIENT
Start: 2021-12-02 | End: 2021-12-02 | Stop reason: HOSPADM

## 2021-12-02 RX ORDER — MIDAZOLAM HYDROCHLORIDE 1 MG/ML
INJECTION INTRAMUSCULAR; INTRAVENOUS AS NEEDED
Status: DISCONTINUED | OUTPATIENT
Start: 2021-12-02 | End: 2021-12-02 | Stop reason: HOSPADM

## 2021-12-02 RX ORDER — FENTANYL CITRATE 50 UG/ML
INJECTION, SOLUTION INTRAMUSCULAR; INTRAVENOUS AS NEEDED
Status: DISCONTINUED | OUTPATIENT
Start: 2021-12-02 | End: 2021-12-02 | Stop reason: HOSPADM

## 2021-12-02 RX ORDER — ENALAPRILAT 2.5 MG/2ML
INJECTION INTRAVENOUS
Status: COMPLETED
Start: 2021-12-02 | End: 2021-12-02

## 2021-12-02 RX ORDER — SODIUM CHLORIDE 9 MG/ML
100 INJECTION, SOLUTION INTRAVENOUS CONTINUOUS
Status: DISPENSED | OUTPATIENT
Start: 2021-12-02 | End: 2021-12-02

## 2021-12-02 RX ORDER — ENALAPRILAT 2.5 MG/2ML
1.25 INJECTION INTRAVENOUS EVERY 6 HOURS
Status: DISCONTINUED | OUTPATIENT
Start: 2021-12-02 | End: 2021-12-02 | Stop reason: HOSPADM

## 2021-12-02 RX ORDER — LIDOCAINE HYDROCHLORIDE 20 MG/ML
INJECTION, SOLUTION INFILTRATION; PERINEURAL AS NEEDED
Status: DISCONTINUED | OUTPATIENT
Start: 2021-12-02 | End: 2021-12-02 | Stop reason: HOSPADM

## 2021-12-02 RX ORDER — ASPIRIN 325 MG
325 TABLET, DELAYED RELEASE (ENTERIC COATED) ORAL DAILY
Status: DISCONTINUED | OUTPATIENT
Start: 2021-12-03 | End: 2021-12-02 | Stop reason: HOSPADM

## 2021-12-02 RX ORDER — ACETAMINOPHEN 325 MG/1
650 TABLET ORAL EVERY 4 HOURS PRN
Status: DISCONTINUED | OUTPATIENT
Start: 2021-12-02 | End: 2021-12-02 | Stop reason: HOSPADM

## 2021-12-02 RX ORDER — ASPIRIN 325 MG
325 TABLET ORAL ONCE
Status: COMPLETED | OUTPATIENT
Start: 2021-12-02 | End: 2021-12-02

## 2021-12-02 RX ADMIN — ENALAPRILAT 1.25 MG: 1.25 INJECTION INTRAVENOUS at 11:13

## 2021-12-02 RX ADMIN — ENALAPRILAT 1.25 MG: 2.5 INJECTION INTRAVENOUS at 11:13

## 2021-12-02 RX ADMIN — Medication 325 MG: at 09:28

## 2021-12-02 RX ADMIN — SODIUM CHLORIDE 125 ML/HR: 9 INJECTION, SOLUTION INTRAVENOUS at 09:28

## 2021-12-02 RX ADMIN — ASPIRIN 325 MG: 325 TABLET ORAL at 09:28

## 2022-02-14 ENCOUNTER — OFFICE VISIT (OUTPATIENT)
Dept: CARDIOLOGY | Facility: CLINIC | Age: 72
End: 2022-02-14

## 2022-02-14 VITALS
DIASTOLIC BLOOD PRESSURE: 70 MMHG | HEART RATE: 60 BPM | WEIGHT: 152 LBS | OXYGEN SATURATION: 98 % | SYSTOLIC BLOOD PRESSURE: 130 MMHG | HEIGHT: 64 IN | BODY MASS INDEX: 25.95 KG/M2

## 2022-02-14 DIAGNOSIS — I10 PRIMARY HYPERTENSION: ICD-10-CM

## 2022-02-14 DIAGNOSIS — E11.69 TYPE 2 DIABETES MELLITUS WITH OTHER SPECIFIED COMPLICATION, UNSPECIFIED WHETHER LONG TERM INSULIN USE: ICD-10-CM

## 2022-02-14 DIAGNOSIS — I25.10 CORONARY ARTERY DISEASE INVOLVING NATIVE CORONARY ARTERY OF NATIVE HEART WITHOUT ANGINA PECTORIS: Primary | ICD-10-CM

## 2022-02-14 DIAGNOSIS — E78.2 MIXED HYPERLIPIDEMIA: ICD-10-CM

## 2022-02-14 PROCEDURE — 93000 ELECTROCARDIOGRAM COMPLETE: CPT | Performed by: INTERNAL MEDICINE

## 2022-02-14 PROCEDURE — 99214 OFFICE O/P EST MOD 30 MIN: CPT | Performed by: INTERNAL MEDICINE

## 2022-02-14 NOTE — ASSESSMENT & PLAN NOTE
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 Her chest pain has resolved

## 2022-02-14 NOTE — PROGRESS NOTES
Referring Provider: Pepe Reyes MD    Reason for Follow-up Visit: chest pain    Subjective .   Chief Complaint:   Chief Complaint   Patient presents with   • Follow-up     3 month fu   • Coronary Artery Disease     s/p cath 21  pt states she is doing well since cath with no complaints of symptoms.   • Hypertension     pt states she tries to take at home but it jumps up and down.   • Hyperlipidemia     last lab done 2020 LDL was 108 on Lipitor 40 mg       History of present illness:  Ruba Benton is a 71 y.o. yo female with noncardiac chest pain and no significant blockage on cath in December. Her chest pain has resolved       History  Past Medical History:   Diagnosis Date   • Arthritis    • Coronary artery disease    • Diabetes mellitus (HCC)    • Hyperlipidemia    • Hypertension    • Injury of back    ,   Past Surgical History:   Procedure Laterality Date   • CARDIAC CATHETERIZATION N/A 2021    Procedure: Coronary angiography;  Surgeon: Antonio Wei MD;  Location:  [facility];  Service: Cardiology;  Laterality: N/A;   •  SECTION     • TUMOR REMOVAL Right    ,   Family History   Problem Relation Age of Onset   • Brain cancer Mother    • Diabetes Mother    • Other Father 46        drowning accident   • Suicidality Sister    • Stroke Sister    • No Known Problems Sister    ,   Social History     Tobacco Use   • Smoking status: Current Every Day Smoker     Packs/day: 1.00     Years: 25.00     Pack years: 25.00     Types: Cigarettes   • Smokeless tobacco: Never Used   • Tobacco comment: given Chantix by her pcp.  has not started yet.   Vaping Use   • Vaping Use: Former   • Substances: Nicotine   • Devices: Disposable, Pre-filled or refillable cartridge, Refillable tank, Pre-filled pod   Substance Use Topics   • Alcohol use: Not Currently   • Drug use: Never   ,     Medications  Current Outpatient Medications   Medication Sig Dispense Refill   • Aspirin-Caffeine  "(BC FAST PAIN RELIEF PO) Take  by mouth As Needed.     • atorvastatin (LIPITOR) 10 MG tablet Take 10 mg by mouth Daily.     • diphenhydrAMINE (BENADRYL) 25 mg capsule Take 25 mg by mouth At Night As Needed for Itching.     • Dulaglutide (Trulicity) 0.75 MG/0.5ML solution pen-injector Inject 0.75 mL as directed 1 (One) Time Per Week.     • furosemide (LASIX) 40 MG tablet Take 40 mg by mouth As Needed.     • loperamide (Imodium A-D) 2 MG capsule Take 2 mg by mouth As Needed for Diarrhea.     • metoprolol tartrate (LOPRESSOR) 100 MG tablet Take 50 mg by mouth 2 (Two) Times a Day.     • omeprazole (priLOSEC) 20 MG capsule Take 20 mg by mouth Daily.     • Spiriva Respimat 1.25 MCG/ACT aerosol solution inhaler INHALE 2 PUFFS INTO THE LUNGS DAILY     • valsartan-hydrochlorothiazide (DIOVAN-HCT) 320-12.5 MG per tablet Take 0.5 tablets by mouth 2 (Two) Times a Day.     • varenicline (CHANTIX RANDY) 0.5 MG X 11 & 1 MG X 42 tablet See Admin Instructions.       No current facility-administered medications for this visit.       Allergies:  Codeine, Morphine, Paroxetine hcl, Darvon [propoxyphene], and Lortab [hydrocodone-acetaminophen]    Review of Systems  Review of Systems   HENT: Negative for nosebleeds.    Cardiovascular: Negative for chest pain, claudication, dyspnea on exertion, leg swelling, near-syncope, orthopnea, palpitations, paroxysmal nocturnal dyspnea and syncope.   Respiratory: Negative for hemoptysis and shortness of breath.    Gastrointestinal: Negative for melena.   Genitourinary: Negative for hematuria.       Objective     Physical Exam:  /70   Pulse 60   Ht 161.3 cm (63.5\")   Wt 68.9 kg (152 lb)   SpO2 98%   BMI 26.50 kg/m²   Pulmonary:      Effort: Pulmonary effort is normal.      Breath sounds: Normal breath sounds.   Cardiovascular:      Normal rate. Regular rhythm.      Murmurs: There is no murmur.   Edema:     Peripheral edema absent.         Results Review:    ECG 12 Lead    Date/Time: 2/14/2022 " 11:43 AM  Performed by: Antonio Wei MD  Authorized by: Antonio Wei MD   Comparison: compared with previous ECG   Similar to previous ECG  Rhythm: sinus rhythm  Rate: normal  Conduction: conduction normal  QRS axis: normal  Other findings: left ventricular hypertrophy with strain    Clinical impression: abnormal EKG            Admission on 12/02/2021, Discharged on 12/02/2021   Component Date Value Ref Range Status   • Glucose 12/02/2021 105* 65 - 99 mg/dL Final   • BUN 12/02/2021 23  8 - 23 mg/dL Final   • Creatinine 12/02/2021 0.77  0.57 - 1.00 mg/dL Final   • Sodium 12/02/2021 142  136 - 145 mmol/L Final   • Potassium 12/02/2021 3.6  3.5 - 5.2 mmol/L Final    Slight hemolysis detected by analyzer. Results may be affected.   • Chloride 12/02/2021 100  98 - 107 mmol/L Final   • CO2 12/02/2021 30.0* 22.0 - 29.0 mmol/L Final   • Calcium 12/02/2021 9.1  8.6 - 10.5 mg/dL Final   • Total Protein 12/02/2021 7.0  6.0 - 8.5 g/dL Final   • Albumin 12/02/2021 4.10  3.50 - 5.20 g/dL Final   • ALT (SGPT) 12/02/2021 9  1 - 33 U/L Final   • AST (SGOT) 12/02/2021 16  1 - 32 U/L Final    Slight hemolysis detected by analyzer. Results may be affected.   • Alkaline Phosphatase 12/02/2021 93  39 - 117 U/L Final   • Total Bilirubin 12/02/2021 0.7  0.0 - 1.2 mg/dL Final   • eGFR Non  Amer 12/02/2021 73  >60 mL/min/1.73 Final   • Globulin 12/02/2021 2.9  gm/dL Final   • A/G Ratio 12/02/2021 1.4  g/dL Final   • BUN/Creatinine Ratio 12/02/2021 29.9* 7.0 - 25.0 Final   • Anion Gap 12/02/2021 12.0  5.0 - 15.0 mmol/L Final   • WBC 12/02/2021 7.83  3.40 - 10.80 10*3/mm3 Final   • RBC 12/02/2021 5.44* 3.77 - 5.28 10*6/mm3 Final   • Hemoglobin 12/02/2021 16.0* 12.0 - 15.9 g/dL Final   • Hematocrit 12/02/2021 49.3* 34.0 - 46.6 % Final   • MCV 12/02/2021 90.6  79.0 - 97.0 fL Final   • MCH 12/02/2021 29.4  26.6 - 33.0 pg Final   • MCHC 12/02/2021 32.5  31.5 - 35.7 g/dL Final   • RDW 12/02/2021 14.2  12.3 - 15.4 % Final   • RDW-SD  12/02/2021 46.7  37.0 - 54.0 fl Final   • MPV 12/02/2021 12.3* 6.0 - 12.0 fL Final   • Platelets 12/02/2021 153  140 - 450 10*3/mm3 Final   • Neutrophil % 12/02/2021 67.0  42.7 - 76.0 % Final   • Lymphocyte % 12/02/2021 19.9  19.6 - 45.3 % Final   • Monocyte % 12/02/2021 10.0  5.0 - 12.0 % Final   • Eosinophil % 12/02/2021 1.8  0.3 - 6.2 % Final   • Basophil % 12/02/2021 0.9  0.0 - 1.5 % Final   • Immature Grans % 12/02/2021 0.4  0.0 - 0.5 % Final   • Neutrophils, Absolute 12/02/2021 5.25  1.70 - 7.00 10*3/mm3 Final   • Lymphocytes, Absolute 12/02/2021 1.56  0.70 - 3.10 10*3/mm3 Final   • Monocytes, Absolute 12/02/2021 0.78  0.10 - 0.90 10*3/mm3 Final   • Eosinophils, Absolute 12/02/2021 0.14  0.00 - 0.40 10*3/mm3 Final   • Basophils, Absolute 12/02/2021 0.07  0.00 - 0.20 10*3/mm3 Final   • Immature Grans, Absolute 12/02/2021 0.03  0.00 - 0.05 10*3/mm3 Final   • nRBC 12/02/2021 0.0  0.0 - 0.2 /100 WBC Final       Assessment/Plan   Problem List Items Addressed This Visit        Cardiac and Vasculature    Primary hypertension - Primary    Current Assessment & Plan     Well controlled         Mixed hyperlipidemia    Current Assessment & Plan     Patient's statin therapy is followed by PCP.           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 Her chest pain has resolved            Endocrine and Metabolic    Type 2 diabetes mellitus with other specified complication (HCC)          Medical Complexity  must have 1 out of 3     Moderate Complexity Level 3           1 of the following medical problems:          []One chronic illness with mild exacerbation         [x]Two or more stable chronic illness          []One new problem  []One acute illness with systemic symptoms    Complexity of Data  Reviewed (1 out of the 3 following  categories)      Category 1 tests, documents, historian (must have 3 points)       []Review of prior external records  [x]Review of results of unique tests  []Ordering unique tests  []Assessment requires an independent historian    Category 2 Interpretation of tests   []Independent interpretation of test read by another doc    Category 3 Discuss Management/tests  []Discussion with external physician    Risk of complications and/or morbidity          [x]Prescription Drug Management

## 2022-03-21 ENCOUNTER — OFFICE VISIT (OUTPATIENT)
Dept: GASTROENTEROLOGY | Facility: CLINIC | Age: 72
End: 2022-03-21

## 2022-03-21 VITALS
HEIGHT: 64 IN | HEART RATE: 64 BPM | WEIGHT: 153 LBS | BODY MASS INDEX: 26.12 KG/M2 | DIASTOLIC BLOOD PRESSURE: 76 MMHG | TEMPERATURE: 96.2 F | OXYGEN SATURATION: 98 % | SYSTOLIC BLOOD PRESSURE: 180 MMHG

## 2022-03-21 DIAGNOSIS — R19.5 POSITIVE COLORECTAL CANCER SCREENING USING COLOGUARD TEST: Primary | ICD-10-CM

## 2022-03-21 PROCEDURE — 99204 OFFICE O/P NEW MOD 45 MIN: CPT | Performed by: NURSE PRACTITIONER

## 2022-03-21 NOTE — PROGRESS NOTES
Chief Complaint   Patient presents with   • GI Problem     Positive cologuard       PCP: Pepe Reyes MD  REFER: Pepe Reyes MD    Subjective     HPI    She presents to office with positive cologaurd test from PCP office in 2022.  Coarse is constant.  Length of time test has been positive is unknown.  Testing performed as part of routine physical.  She denies change in bowels.  Bowels described as moving without difficulty, no change. No abdominal pain.  No BRBPR.  No melena.  Weight is stable.  She has undergone previous colonoscopy evaluation.  There is not a family history of colon cancer.       Colonoscopy over 10 years ago.   Sister had colon polyps.     Past Medical History:   Diagnosis Date   • Arthritis    • Coronary artery disease    • Diabetes mellitus (HCC)    • Hyperlipidemia    • Hypertension    • Injury of back        Past Surgical History:   Procedure Laterality Date   • CARDIAC CATHETERIZATION N/A 2021    Procedure: Coronary angiography;  Surgeon: Antonio Wei MD;  Location:  [facility] CATH INVASIVE LOCATION;  Service: Cardiology;  Laterality: N/A;   •  SECTION     • EYE SURGERY     • HYSTERECTOMY     • TUMOR REMOVAL Right        Outpatient Medications Marked as Taking for the 3/21/22 encounter (Office Visit) with Mary Juarez APRN   Medication Sig Dispense Refill   • Aspirin-Caffeine (BC FAST PAIN RELIEF PO) Take  by mouth As Needed.     • atorvastatin (LIPITOR) 10 MG tablet Take 10 mg by mouth Daily.     • diphenhydrAMINE (BENADRYL) 25 mg capsule Take 25 mg by mouth At Night As Needed for Itching.     • Dulaglutide (Trulicity) 0.75 MG/0.5ML solution pen-injector Inject 0.75 mL as directed 1 (One) Time Per Week.     • furosemide (LASIX) 40 MG tablet Take 40 mg by mouth As Needed.     • loperamide (IMODIUM) 2 MG capsule Take 2 mg by mouth As Needed for Diarrhea.     • metoprolol tartrate (LOPRESSOR) 100 MG tablet Take 50 mg by mouth 2 (Two) Times a Day.     • omeprazole  "(priLOSEC) 20 MG capsule Take 20 mg by mouth Daily.     • Spiriva Respimat 1.25 MCG/ACT aerosol solution inhaler INHALE 2 PUFFS INTO THE LUNGS DAILY     • valsartan 80 MG tablet 320 mg, hydroCHLOROthiazide 25 MG tablet 25 mg Take 0.5 doses by mouth 2 (Two) Times a Day.         Allergies   Allergen Reactions   • Codeine Anaphylaxis   • Morphine Anaphylaxis   • Paroxetine Hcl Hives   • Darvon [Propoxyphene] Nausea And Vomiting   • Lortab [Hydrocodone-Acetaminophen] Nausea And Vomiting       Social History     Socioeconomic History   • Marital status:    Tobacco Use   • Smoking status: Current Every Day Smoker     Packs/day: 1.00     Years: 25.00     Pack years: 25.00     Types: Cigarettes   • Smokeless tobacco: Never Used   • Tobacco comment: given Chantix by her pcp.  has not started yet.   Vaping Use   • Vaping Use: Former   • Substances: Nicotine   • Devices: Disposable, Pre-filled or refillable cartridge, Refillable tank, Pre-filled pod   Substance and Sexual Activity   • Alcohol use: Not Currently   • Drug use: Never   • Sexual activity: Defer       Family History   Problem Relation Age of Onset   • Brain cancer Mother    • Diabetes Mother    • Other Father 46        drowning accident   • Colon polyps Sister    • Suicidality Sister    • Stroke Sister    • No Known Problems Sister    • Colon cancer Neg Hx        Review of Systems   Constitutional: Negative for chills, fever and unexpected weight change.   Respiratory: Negative for cough, shortness of breath and wheezing.    Cardiovascular: Negative for chest pain and palpitations.   Gastrointestinal: Negative for abdominal distention, abdominal pain, anal bleeding, blood in stool, constipation, nausea and vomiting.       Objective     Vitals:    03/21/22 1358   BP: 180/76   Pulse: 64   Temp: 96.2 °F (35.7 °C)   SpO2: 98%   Weight: 69.4 kg (153 lb)   Height: 161.3 cm (63.5\")     Body mass index is 26.68 kg/m².    Physical Exam  Vitals reviewed. "   Constitutional:       General: She is not in acute distress.  Cardiovascular:      Rate and Rhythm: Normal rate and regular rhythm.      Heart sounds: Normal heart sounds.   Pulmonary:      Effort: Pulmonary effort is normal.      Breath sounds: Normal breath sounds.   Abdominal:      General: Bowel sounds are normal. There is no distension.      Palpations: Abdomen is soft.      Tenderness: There is no abdominal tenderness.   Skin:     General: Skin is warm and dry.   Neurological:      Mental Status: She is alert.         Imaging Results (Most Recent)     None          Body mass index is 26.68 kg/m².    Assessment/Plan     Diagnoses and all orders for this visit:    1. Positive colorectal cancer screening using Cologuard test (Primary)  -     Case Request; Standing  -     Case Request    Other orders  -     Follow Anesthesia Guidelines / Protocol; Future  -     Obtain Informed Consent; Future        COLONOSCOPY WITH ANESTHESIA (N/A)   Use miralax prep.       I have explained a positive cologuard indicates the presence of DNA/hgb in stool that is associated with colon polyps or colon cancer.  There is a chance the test is a false positive with that chance being higher for people over the age of 65. This is due to normal changes in DNA associated with getting older (AGA).  Due to the positive result of the Cologuard I recommend pt undergoing colonoscopy.  Colonoscopy remains the gold standard for detection of colon polyps/colon cancer.      All risks, benefits, alternatives, and indications of colonoscopy procedure have been discussed with the patient. Risks to include perforation of the colon requiring possible surgery or colostomy, risk of bleeding from biopsies or removal of colon tissue, possibility of missing a colon polyp or cancer, or adverse drug reaction.  Benefits to include the diagnosis and management of disease of the colon and rectum.  Pt verbalizes understanding and agrees to proceed with  procedure.

## 2022-04-07 ENCOUNTER — ANESTHESIA (OUTPATIENT)
Dept: GASTROENTEROLOGY | Facility: HOSPITAL | Age: 72
End: 2022-04-07

## 2022-04-07 ENCOUNTER — ANESTHESIA EVENT (OUTPATIENT)
Dept: GASTROENTEROLOGY | Facility: HOSPITAL | Age: 72
End: 2022-04-07

## 2022-04-07 ENCOUNTER — HOSPITAL ENCOUNTER (OUTPATIENT)
Facility: HOSPITAL | Age: 72
Setting detail: HOSPITAL OUTPATIENT SURGERY
Discharge: HOME | End: 2022-04-07
Attending: INTERNAL MEDICINE | Admitting: INTERNAL MEDICINE

## 2022-04-07 VITALS
RESPIRATION RATE: 18 BRPM | OXYGEN SATURATION: 94 % | TEMPERATURE: 97 F | SYSTOLIC BLOOD PRESSURE: 147 MMHG | WEIGHT: 143 LBS | DIASTOLIC BLOOD PRESSURE: 57 MMHG | BODY MASS INDEX: 25.34 KG/M2 | HEIGHT: 63 IN | HEART RATE: 62 BPM

## 2022-04-07 DIAGNOSIS — R19.5 POSITIVE COLORECTAL CANCER SCREENING USING COLOGUARD TEST: ICD-10-CM

## 2022-04-07 PROCEDURE — 25010000002 PROPOFOL 10 MG/ML EMULSION: Performed by: NURSE ANESTHETIST, CERTIFIED REGISTERED

## 2022-04-07 PROCEDURE — 88305 TISSUE EXAM BY PATHOLOGIST: CPT | Performed by: INTERNAL MEDICINE

## 2022-04-07 PROCEDURE — 45385 COLONOSCOPY W/LESION REMOVAL: CPT | Performed by: INTERNAL MEDICINE

## 2022-04-07 RX ORDER — SODIUM CHLORIDE 0.9 % (FLUSH) 0.9 %
10 SYRINGE (ML) INJECTION AS NEEDED
Status: DISCONTINUED | OUTPATIENT
Start: 2022-04-07 | End: 2022-04-07 | Stop reason: HOSPADM

## 2022-04-07 RX ORDER — SODIUM CHLORIDE 9 MG/ML
500 INJECTION, SOLUTION INTRAVENOUS CONTINUOUS PRN
Status: DISCONTINUED | OUTPATIENT
Start: 2022-04-07 | End: 2022-04-07 | Stop reason: HOSPADM

## 2022-04-07 RX ORDER — ONDANSETRON 2 MG/ML
4 INJECTION INTRAMUSCULAR; INTRAVENOUS ONCE AS NEEDED
Status: DISCONTINUED | OUTPATIENT
Start: 2022-04-07 | End: 2022-04-07 | Stop reason: HOSPADM

## 2022-04-07 RX ORDER — LIDOCAINE HYDROCHLORIDE 10 MG/ML
0.5 INJECTION, SOLUTION EPIDURAL; INFILTRATION; INTRACAUDAL; PERINEURAL ONCE AS NEEDED
Status: DISCONTINUED | OUTPATIENT
Start: 2022-04-07 | End: 2022-04-07 | Stop reason: HOSPADM

## 2022-04-07 RX ORDER — PROPOFOL 10 MG/ML
VIAL (ML) INTRAVENOUS AS NEEDED
Status: DISCONTINUED | OUTPATIENT
Start: 2022-04-07 | End: 2022-04-07 | Stop reason: SURG

## 2022-04-07 RX ADMIN — PROPOFOL 80 MG: 10 INJECTION, EMULSION INTRAVENOUS at 12:30

## 2022-04-07 RX ADMIN — SODIUM CHLORIDE 500 ML: 9 INJECTION, SOLUTION INTRAVENOUS at 11:04

## 2022-04-07 RX ADMIN — PROPOFOL 80 MG: 10 INJECTION, EMULSION INTRAVENOUS at 12:34

## 2022-04-07 RX ADMIN — PROPOFOL 80 MG: 10 INJECTION, EMULSION INTRAVENOUS at 12:39

## 2022-04-07 NOTE — ANESTHESIA PREPROCEDURE EVALUATION
Anesthesia Evaluation     Patient summary reviewed   no history of anesthetic complications:  NPO Solid Status: > 8 hours             Airway   Mallampati: II  Dental    (+) upper dentures    Pulmonary    (+) a smoker, COPD,   Cardiovascular   Exercise tolerance: good (4-7 METS)    (+) hypertension, hyperlipidemia,       Neuro/Psych- negative ROS  GI/Hepatic/Renal/Endo    (+)   diabetes mellitus,     Musculoskeletal     Abdominal    Substance History      OB/GYN          Other                        Anesthesia Plan    ASA 2     MAC       Anesthetic plan, all risks, benefits, and alternatives have been provided, discussed and informed consent has been obtained with: patient.        CODE STATUS:

## 2022-04-07 NOTE — ANESTHESIA POSTPROCEDURE EVALUATION
Patient: Ruba Benton    Procedure Summary     Date: 04/07/22 Room / Location: [facility] ENDOSCOPY 4 / [facility]    Anesthesia Start: 1225 Anesthesia Stop: 1246    Procedure: COLONOSCOPY WITH ANESTHESIA (N/A ) Diagnosis:       Positive colorectal cancer screening using Cologuard test      (Positive colorectal cancer screening using Cologuard test [R19.5])    Surgeons: Terrell Harkins MD Provider: Alex De Jesus CRNA    Anesthesia Type: MAC ASA Status: 2          Anesthesia Type: MAC    Vitals  No vitals data found for the desired time range.          Post Anesthesia Care and Evaluation    Patient location during evaluation: PHASE II  Patient participation: complete - patient participated  Level of consciousness: awake and alert  Pain management: adequate  Airway patency: patent  Anesthetic complications: No anesthetic complications  PONV Status: none  Cardiovascular status: acceptable and stable  Respiratory status: acceptable and unassisted  Hydration status: acceptable

## 2022-04-08 LAB
CYTO UR: NORMAL
LAB AP CASE REPORT: NORMAL
PATH REPORT.FINAL DX SPEC: NORMAL
PATH REPORT.GROSS SPEC: NORMAL

## 2022-07-25 ENCOUNTER — TELEPHONE (OUTPATIENT)
Dept: GASTROENTEROLOGY | Facility: CLINIC | Age: 72
End: 2022-07-25

## 2024-02-05 ENCOUNTER — HOSPITAL ENCOUNTER (EMERGENCY)
Facility: HOSPITAL | Age: 74
Discharge: HOME | End: 2024-02-05
Admitting: FAMILY MEDICINE
Payer: MEDICARE

## 2024-02-05 ENCOUNTER — APPOINTMENT (OUTPATIENT)
Dept: CT IMAGING | Facility: HOSPITAL | Age: 74
End: 2024-02-05
Payer: MEDICARE

## 2024-02-05 VITALS
BODY MASS INDEX: 25.34 KG/M2 | TEMPERATURE: 98 F | HEIGHT: 63 IN | SYSTOLIC BLOOD PRESSURE: 155 MMHG | DIASTOLIC BLOOD PRESSURE: 96 MMHG | RESPIRATION RATE: 15 BRPM | OXYGEN SATURATION: 96 % | WEIGHT: 143 LBS | HEART RATE: 68 BPM

## 2024-02-05 DIAGNOSIS — K52.9 COLITIS: Primary | ICD-10-CM

## 2024-02-05 DIAGNOSIS — R11.2 NAUSEA AND VOMITING, UNSPECIFIED VOMITING TYPE: ICD-10-CM

## 2024-02-05 DIAGNOSIS — R10.30 LOWER ABDOMINAL PAIN: ICD-10-CM

## 2024-02-05 LAB
ABO GROUP BLD: NORMAL
ALBUMIN SERPL-MCNC: 4.4 G/DL (ref 3.5–5.2)
ALBUMIN/GLOB SERPL: 1.3 G/DL
ALP SERPL-CCNC: 131 U/L (ref 39–117)
ALT SERPL W P-5'-P-CCNC: 8 U/L (ref 1–33)
ANION GAP SERPL CALCULATED.3IONS-SCNC: 14 MMOL/L (ref 5–15)
AST SERPL-CCNC: 15 U/L (ref 1–32)
BASOPHILS # BLD AUTO: 0.03 10*3/MM3 (ref 0–0.2)
BASOPHILS NFR BLD AUTO: 0.3 % (ref 0–1.5)
BILIRUB SERPL-MCNC: 0.7 MG/DL (ref 0–1.2)
BLD GP AB SCN SERPL QL: NEGATIVE
BUN SERPL-MCNC: 26 MG/DL (ref 8–23)
BUN/CREAT SERPL: 22.2 (ref 7–25)
CALCIUM SPEC-SCNC: 10 MG/DL (ref 8.6–10.5)
CHLORIDE SERPL-SCNC: 98 MMOL/L (ref 98–107)
CO2 SERPL-SCNC: 28 MMOL/L (ref 22–29)
CREAT SERPL-MCNC: 1.17 MG/DL (ref 0.57–1)
D-LACTATE SERPL-SCNC: 2.1 MMOL/L (ref 0.5–2)
DEPRECATED RDW RBC AUTO: 45.9 FL (ref 37–54)
EGFRCR SERPLBLD CKD-EPI 2021: 48.8 ML/MIN/1.73
EOSINOPHIL # BLD AUTO: 0 10*3/MM3 (ref 0–0.4)
EOSINOPHIL NFR BLD AUTO: 0 % (ref 0.3–6.2)
ERYTHROCYTE [DISTWIDTH] IN BLOOD BY AUTOMATED COUNT: 13.5 % (ref 12.3–15.4)
GLOBULIN UR ELPH-MCNC: 3.4 GM/DL
GLUCOSE SERPL-MCNC: 119 MG/DL (ref 65–99)
HCT VFR BLD AUTO: 55.1 % (ref 34–46.6)
HGB BLD-MCNC: 17.8 G/DL (ref 12–15.9)
HOLD SPECIMEN: NORMAL
HOLD SPECIMEN: NORMAL
IMM GRANULOCYTES # BLD AUTO: 0.02 10*3/MM3 (ref 0–0.05)
IMM GRANULOCYTES NFR BLD AUTO: 0.2 % (ref 0–0.5)
LYMPHOCYTES # BLD AUTO: 0.92 10*3/MM3 (ref 0.7–3.1)
LYMPHOCYTES NFR BLD AUTO: 7.8 % (ref 19.6–45.3)
MCH RBC QN AUTO: 30.2 PG (ref 26.6–33)
MCHC RBC AUTO-ENTMCNC: 32.3 G/DL (ref 31.5–35.7)
MCV RBC AUTO: 93.4 FL (ref 79–97)
MONOCYTES # BLD AUTO: 0.77 10*3/MM3 (ref 0.1–0.9)
MONOCYTES NFR BLD AUTO: 6.6 % (ref 5–12)
NEUTROPHILS NFR BLD AUTO: 85.1 % (ref 42.7–76)
NEUTROPHILS NFR BLD AUTO: 9.99 10*3/MM3 (ref 1.7–7)
NRBC BLD AUTO-RTO: 0 /100 WBC (ref 0–0.2)
PLATELET # BLD AUTO: 220 10*3/MM3 (ref 140–450)
PMV BLD AUTO: 11.6 FL (ref 6–12)
POTASSIUM SERPL-SCNC: 3.5 MMOL/L (ref 3.5–5.2)
PROT SERPL-MCNC: 7.8 G/DL (ref 6–8.5)
RBC # BLD AUTO: 5.9 10*6/MM3 (ref 3.77–5.28)
RH BLD: POSITIVE
SODIUM SERPL-SCNC: 140 MMOL/L (ref 136–145)
T&S EXPIRATION DATE: NORMAL
WBC NRBC COR # BLD AUTO: 11.73 10*3/MM3 (ref 3.4–10.8)
WHOLE BLOOD HOLD COAG: NORMAL
WHOLE BLOOD HOLD SPECIMEN: NORMAL

## 2024-02-05 PROCEDURE — 80053 COMPREHEN METABOLIC PANEL: CPT

## 2024-02-05 PROCEDURE — 96361 HYDRATE IV INFUSION ADD-ON: CPT

## 2024-02-05 PROCEDURE — 36415 COLL VENOUS BLD VENIPUNCTURE: CPT

## 2024-02-05 PROCEDURE — 96374 THER/PROPH/DIAG INJ IV PUSH: CPT

## 2024-02-05 PROCEDURE — 99285 EMERGENCY DEPT VISIT HI MDM: CPT

## 2024-02-05 PROCEDURE — 25510000001 IOPAMIDOL 61 % SOLUTION

## 2024-02-05 PROCEDURE — 86850 RBC ANTIBODY SCREEN: CPT

## 2024-02-05 PROCEDURE — 25810000003 SODIUM CHLORIDE 0.9 % SOLUTION

## 2024-02-05 PROCEDURE — 86901 BLOOD TYPING SEROLOGIC RH(D): CPT

## 2024-02-05 PROCEDURE — 25010000002 ONDANSETRON PER 1 MG

## 2024-02-05 PROCEDURE — 86900 BLOOD TYPING SEROLOGIC ABO: CPT

## 2024-02-05 PROCEDURE — 74177 CT ABD & PELVIS W/CONTRAST: CPT

## 2024-02-05 PROCEDURE — 85025 COMPLETE CBC W/AUTO DIFF WBC: CPT

## 2024-02-05 PROCEDURE — 83605 ASSAY OF LACTIC ACID: CPT

## 2024-02-05 RX ORDER — METRONIDAZOLE 500 MG/1
500 TABLET ORAL 2 TIMES DAILY
Qty: 14 TABLET | Refills: 0 | Status: SHIPPED | OUTPATIENT
Start: 2024-02-05 | End: 2024-02-12

## 2024-02-05 RX ORDER — ONDANSETRON 2 MG/ML
4 INJECTION INTRAMUSCULAR; INTRAVENOUS ONCE
Status: COMPLETED | OUTPATIENT
Start: 2024-02-05 | End: 2024-02-05

## 2024-02-05 RX ORDER — CIPROFLOXACIN 500 MG/1
500 TABLET, FILM COATED ORAL 2 TIMES DAILY
Qty: 14 TABLET | Refills: 0 | Status: SHIPPED | OUTPATIENT
Start: 2024-02-05 | End: 2024-02-12

## 2024-02-05 RX ORDER — ONDANSETRON 4 MG/1
4 TABLET, ORALLY DISINTEGRATING ORAL EVERY 6 HOURS PRN
Qty: 12 TABLET | Refills: 0 | Status: SHIPPED | OUTPATIENT
Start: 2024-02-05 | End: 2024-02-08

## 2024-02-05 RX ORDER — SODIUM CHLORIDE 0.9 % (FLUSH) 0.9 %
10 SYRINGE (ML) INJECTION AS NEEDED
Status: DISCONTINUED | OUTPATIENT
Start: 2024-02-05 | End: 2024-02-05 | Stop reason: HOSPADM

## 2024-02-05 RX ADMIN — SODIUM CHLORIDE 1000 ML: 9 INJECTION, SOLUTION INTRAVENOUS at 19:10

## 2024-02-05 RX ADMIN — IOPAMIDOL 100 ML: 612 INJECTION, SOLUTION INTRAVENOUS at 19:51

## 2024-02-05 RX ADMIN — ONDANSETRON 4 MG: 2 INJECTION INTRAMUSCULAR; INTRAVENOUS at 19:10

## 2024-02-06 NOTE — ED PROVIDER NOTES
Subjective   History of Present Illness  Patient is a 73-year-old female who presents emergency department with complaints of lower abdominal pain, diarrhea, rectal bleeding, vomiting.  Patient states that symptoms started yesterday afternoon.  Patient is having tenderness in the right lower quadrant.  She states that she is still currently nauseous.  Patient states that she has had colonoscopy in the past and they removed polyps a couple years ago.  Patient states that sometimes she has rectal bleeding on and off since, however she states that it was more blood this time.  Denies coffee-ground emesis.  Denies fevers.        Review of Systems   Gastrointestinal:  Positive for abdominal pain, blood in stool, nausea and vomiting.   All other systems reviewed and are negative.      Past Medical History:   Diagnosis Date    Arthritis     Coronary artery disease     Diabetes mellitus     Hyperlipidemia     Hypertension     Injury of back     Sleep apnea        Allergies   Allergen Reactions    Codeine Anaphylaxis    Morphine Anaphylaxis    Paroxetine Hcl Hives    Darvon [Propoxyphene] Nausea And Vomiting    Lortab [Hydrocodone-Acetaminophen] Nausea And Vomiting       Past Surgical History:   Procedure Laterality Date    ABDOMINAL SURGERY      CARDIAC CATHETERIZATION N/A 2021    Procedure: Coronary angiography;  Surgeon: Antonio Wei MD;  Location: [facility] CATH INVASIVE LOCATION;  Service: Cardiology;  Laterality: N/A;     SECTION      COLONOSCOPY N/A 2022    Procedure: COLONOSCOPY WITH ANESTHESIA;  Surgeon: Terrell Harkins MD;  Location: [facility] ENDOSCOPY;  Service: Gastroenterology;  Laterality: N/A;  pre positive cologuard  post diveerticulosis,polyps  Pepe Reyes MD    EYE SURGERY      HYSTERECTOMY      TUMOR REMOVAL Right        Family History   Problem Relation Age of Onset    Brain cancer Mother     Diabetes Mother     Other Father 46        drowning accident    Colon polyps Sister      Suicidality Sister     Stroke Sister     No Known Problems Sister     Colon cancer Neg Hx        Social History     Socioeconomic History    Marital status:    Tobacco Use    Smoking status: Every Day     Packs/day: 1.00     Years: 25.00     Additional pack years: 0.00     Total pack years: 25.00     Types: Cigarettes    Smokeless tobacco: Never    Tobacco comments:     given Chantix by her pcp.  has not started yet.   Vaping Use    Vaping Use: Former    Devices: Disposable, Pre-filled or refillable cartridge, Refillable tank, Pre-filled pod   Substance and Sexual Activity    Alcohol use: Not Currently    Drug use: Never    Sexual activity: Defer           Objective   Physical Exam  Vitals and nursing note reviewed.   Constitutional:       General: She is not in acute distress.     Appearance: Normal appearance. She is normal weight. She is not ill-appearing or toxic-appearing.   HENT:      Head: Normocephalic.   Cardiovascular:      Rate and Rhythm: Normal rate and regular rhythm.      Pulses: Normal pulses.      Heart sounds: Normal heart sounds.   Pulmonary:      Effort: Pulmonary effort is normal.      Breath sounds: Normal breath sounds.   Abdominal:      General: Abdomen is flat. Bowel sounds are normal. There is no distension.      Palpations: Abdomen is soft.      Tenderness: There is abdominal tenderness in the right lower quadrant.   Musculoskeletal:         General: Normal range of motion.      Cervical back: Normal range of motion and neck supple.   Skin:     General: Skin is warm and dry.   Neurological:      General: No focal deficit present.      Mental Status: She is alert and oriented to person, place, and time. Mental status is at baseline.   Psychiatric:         Mood and Affect: Mood normal.         Behavior: Behavior normal.         Thought Content: Thought content normal.         Judgment: Judgment normal.         Procedures           ED Course                                              Medical Decision Making  Ruba Benton is a very pleasant 73 y.o. female who presents to the emergency department for abdominal pain and bright red blood in stool.     Patient was non-toxic and not-ill appearing on arrival. Vital signs stable.     Patient's presentation raises suspicion for differentials including, but not limited to, GI bleed, colitis, diverticulitis.     External (non-ED) record review: None    Given this, Ruba was placed on the monitor. Laboratory studies and imaging studies were ordered including CBC, CMP, lactic acid, type and screen, CT abdomen pelvis with contrast.     Ruba was given IV fluids and IV Zofran for symptomatic relief.    Imaging was reviewed by radiologist. CT abdomen pelvis revealed There is a segmental colitis involving the distal transverse and left colon with circumferential thickening of the colonic wall and pericolonic inflammatory stranding. Multiple diverticula are noted in the sigmoid colon without evidence of diverticulitis. No mechanical obstruction. There is atheromatous calcification of the abdominal aorta and iliac arteries. There is also calcification and stenosis of the celiac and SMA the ALVAREZ is not clearly identified. Small fat-containing periumbilical hernia. The patient has undergone prior hysterectomy. No adnexal mass or free fluid. Left adrenal nodule likely representing an adenoma. No renal mass or obstructive uropathy.    Labs were reviewed.  CBC with mild leukocytosis, stable H&H.  CMP with creatinine 1.17, BUN 26.  Lactic acid 2.1.  On re-evaluation, patient remained hemodynamically stable and appeared to be comfortable and in no acute distress.    Given findings described above, patient's presentation is most likely related to colitis.  Patient was prescribed oral antibiotics at discharge.  Advised to stay well-hydrated.  Advised to return the ER if symptoms do not improve within 48 hours. Case discussed with Dr. Jade.    I  discussed all of the lab and imaging results with the patient during this visit in the emergency department. I answered all the questions regarding the emergency department evaluation, diagnosis, and treatment plan. We talked about how crucial it is for the patient to follow up by calling their primary care provider as soon as possible to schedule an appointment for within the next few days or as soon as possible so that the symptoms can be reassessed to see if they have improved or to answer any additional questions. I also provided the patient with advice on returning safely and urged the patient to visit the emergency department right away if any worsening or new symptoms appeared. The patient verbalized understanding of the discharge instructions and agreed with them. Ruba was discharged in stable condition.    Signed by:   MAURISIO Ziegler 2/5/2024 21:14 CST     Dragon disclaimer:  Part of this note may be an electronic transcription/translation of spoken language to printed text using the Dragon Dictation System.    Problems Addressed:  Colitis: complicated acute illness or injury  Lower abdominal pain: complicated acute illness or injury  Nausea and vomiting, unspecified vomiting type: complicated acute illness or injury    Amount and/or Complexity of Data Reviewed  Radiology: ordered.    Risk  Prescription drug management.        Final diagnoses:   Colitis   Lower abdominal pain   Nausea and vomiting, unspecified vomiting type       ED Disposition  ED Disposition       ED Disposition   Discharge    Condition   Stable    Comment   --               Bharat Wiseman Jr., MD  [address] [facility] [address]  [phone]    Schedule an appointment as soon as possible for a visit in 1 day      [facility] EMERGENCY DEPARTMENT  [address] [place] [id number] [phone]  Go to   If symptoms worsen         Medication List        New Prescriptions      ciprofloxacin 500 MG  tablet  Commonly known as: CIPRO  Take 1 tablet by mouth 2 (Two) Times a Day for 7 days.     metroNIDAZOLE 500 MG tablet  Commonly known as: FLAGYL  Take 1 tablet by mouth 2 (Two) Times a Day for 7 days.     ondansetron ODT 4 MG disintegrating tablet  Commonly known as: ZOFRAN-ODT  Place 1 tablet on the tongue Every 6 (Six) Hours As Needed for Nausea or Vomiting for up to 3 days.               Where to Get Your Medications        These medications were sent to [id number] - [place], [place] - [address] AT [facility] [address] - [phone]  - [phone] FX  [address], [facility] [address]      Phone: [phone]   ciprofloxacin 500 MG tablet  metroNIDAZOLE 500 MG tablet  ondansetron ODT 4 MG disintegrating tablet            Heidi Cuello, MAURISIO  02/05/24 0185

## 2025-04-10 PROBLEM — N39.0 CATHETER-ASSOCIATED URINARY TRACT INFECTION: Status: ACTIVE | Noted: 2025-04-10

## 2025-04-10 PROBLEM — T83.511A CATHETER-ASSOCIATED URINARY TRACT INFECTION: Status: ACTIVE | Noted: 2025-04-10

## 2025-04-10 PROCEDURE — 87086 URINE CULTURE/COLONY COUNT: CPT | Performed by: PHYSICIAN ASSISTANT

## 2025-04-29 ENCOUNTER — NURSE TRIAGE (OUTPATIENT)
Dept: CALL CENTER | Facility: HOSPITAL | Age: 75
End: 2025-04-29
Payer: MEDICARE

## 2025-04-29 NOTE — TELEPHONE ENCOUNTER
Called MD office yesterday regarding urinary symptoms  Was told they would call something in to the pharmacy  She says they have not.  Advised to follow up with MD office regarding prescription or go to the Urgent care.            Reason for Disposition   Bad or foul-smelling urine    Additional Information   Negative: Shock suspected (e.g., cold/pale/clammy skin, too weak to stand, low BP, rapid pulse)   Negative: Sounds like a life-threatening emergency to the triager   Negative: Followed a female genital area injury (e.g., labia, vagina, vulva)   Negative: Followed a male genital area injury (e.g., penis, scrotum)   Negative: Vaginal discharge   Negative: Pus (white, yellow) or bloody discharge from end of penis   Negative: [1] Taking antibiotic for urinary tract infection (UTI) AND [2] female   Negative: [1] Taking antibiotic for urinary tract infection (UTI) AND [2] male   Negative: [1] Pain or burning with passing urine (urination) AND [2] pregnant   Negative: [1] Pain or burning with passing urine (urination) AND [2] postpartum (from 0 to 6 weeks after delivery)   Negative: [1] Pain or burning with passing urine (urination) AND [2] female   Negative: [1] Pain or burning with passing urine (urination) AND [2] male   Negative: Pain or itching in the vulvar area   Negative: Pain in scrotum is main symptom   Negative: Blood in the urine is main symptom   Negative: Symptoms arising from use of a urinary catheter (e.g., Coude, Singh)   Negative: [1] Unable to urinate (or only a few drops) > 4 hours AND [2] bladder feels very full (e.g., palpable bladder or strong urge to urinate)   Negative: [1] Decreased urination and [2] drinking very little AND [2] dehydration suspected (e.g., dark urine, no urine > 12 hours, very dry mouth, very lightheaded)   Negative: Patient sounds very sick or weak to the triager   Negative: Fever > 100.4 F (38.0 C)   Negative: Side (flank) or lower back pain present   Negative: Urinating  "more frequently than usual (i.e., frequency)    Answer Assessment - Initial Assessment Questions  1. SYMPTOM: \"What's the main symptom you're concerned about?\" (e.g., frequency, incontinence)         Blood in urine  2. ONSET: \"When did the  *No Answer*  start?\"         yesterday  3. PAIN: \"Is there any pain?\" If Yes, ask: \"How bad is it?\" (Scale: 1-10; mild, moderate, severe)         Has spasms 5/10  4. CAUSE: \"What do you think is causing the symptoms?\"           UTI  5. OTHER SYMPTOMS: \"Do you have any other symptoms?\" (e.g., blood in urine, fever, flank pain, pain with urination)      Says urine is the color of blood     Takes no blood thinners  6. PREGNANCY: \"Is there any chance you are pregnant?\" \"When was your last menstrual period?\"      na    Protocols used: Urinary Symptoms-ADULT-AH    "

## 2025-05-13 ENCOUNTER — OFFICE VISIT (OUTPATIENT)
Age: 75
End: 2025-05-13

## 2025-05-13 VITALS
TEMPERATURE: 97.1 F | OXYGEN SATURATION: 96 % | SYSTOLIC BLOOD PRESSURE: 120 MMHG | DIASTOLIC BLOOD PRESSURE: 60 MMHG | BODY MASS INDEX: 29.06 KG/M2 | HEART RATE: 78 BPM | RESPIRATION RATE: 20 BRPM | HEIGHT: 63 IN | WEIGHT: 164 LBS

## 2025-05-13 DIAGNOSIS — R35.0 URINARY FREQUENCY: ICD-10-CM

## 2025-05-13 DIAGNOSIS — N32.89 BLADDER SPASMS: ICD-10-CM

## 2025-05-13 DIAGNOSIS — N30.01 ACUTE CYSTITIS WITH HEMATURIA: Primary | ICD-10-CM

## 2025-05-13 LAB
BILIRUBIN, POC: ABNORMAL
BLOOD URINE, POC: ABNORMAL
CLARITY, POC: ABNORMAL
COLOR, POC: ABNORMAL
GLUCOSE URINE, POC: ABNORMAL MG/DL
KETONES, POC: ABNORMAL MG/DL
LEUKOCYTE EST, POC: ABNORMAL
NITRITE, POC: ABNORMAL
PH, POC: 5.5
PROTEIN, POC: >=300 MG/DL
SPECIFIC GRAVITY, POC: >=1.03
UROBILINOGEN, POC: 0.2 MG/DL

## 2025-05-13 RX ORDER — TIOTROPIUM BROMIDE INHALATION SPRAY 1.56 UG/1
SPRAY, METERED RESPIRATORY (INHALATION)
COMMUNITY
Start: 2025-04-08

## 2025-05-13 RX ORDER — CEPHALEXIN 500 MG/1
500 CAPSULE ORAL 2 TIMES DAILY
Qty: 20 CAPSULE | Refills: 0 | Status: SHIPPED | OUTPATIENT
Start: 2025-05-13 | End: 2025-05-23

## 2025-05-13 RX ORDER — ATORVASTATIN CALCIUM 10 MG/1
10 TABLET, FILM COATED ORAL EVERY EVENING
COMMUNITY
Start: 2024-06-12

## 2025-05-13 RX ORDER — TIRZEPATIDE 5 MG/.5ML
INJECTION, SOLUTION SUBCUTANEOUS
COMMUNITY
Start: 2025-05-05

## 2025-05-13 RX ORDER — AMLODIPINE BESYLATE 5 MG/1
5 TABLET ORAL
COMMUNITY
Start: 2025-05-05

## 2025-05-13 ASSESSMENT — ENCOUNTER SYMPTOMS
NAUSEA: 0
COLOR CHANGE: 0
APNEA: 0
ABDOMINAL DISTENTION: 0
EYE DISCHARGE: 0
SINUS PAIN: 0
ABDOMINAL PAIN: 0
SHORTNESS OF BREATH: 0
BACK PAIN: 1
WHEEZING: 0
SINUS PRESSURE: 0
VOMITING: 0
SORE THROAT: 0
DIARRHEA: 0
COUGH: 0
CONSTIPATION: 0
EYE ITCHING: 0
EYE PAIN: 0
TROUBLE SWALLOWING: 0
RHINORRHEA: 0
CHEST TIGHTNESS: 0

## 2025-05-13 NOTE — PROGRESS NOTES
[facility] URGENT CARE, [facility] ([place])  [facility] URGENT CARE  [address]  [facility] [address]  Dept: [phone]  Dept Fax: [phone]    Charito Hooper is a 74 y.o. female who presents today for her medical conditions/complaints as noted below.  Charito Hooper is c/o of Back Pain (Low, Midline back pain ) and Urinary Tract Infection (Frequency, x2 months, pt reports multiple rounds of abx with no relief. /)        HPI:     Charito Hooper presents with complaints of urinary frequency, urinary urgency, bladder spasms, and mid-low bilateral back/flank pain. Patient states her symptoms started around 2 months ago. She was seen in the ER on 4/10/25, she had a negative UA, but they started her on Macrobid. Patient states after she started that medication, her symptoms go better temporarily, but then they come back and are now worse. She states she is having some chills, but denies any fever. She is not taking any OTC medications. She states she does have urinary and bowel incontinence frequently, but the urinary incontinence has worsened due to the urinary frequency. She is stable at this time.     Last antibiotic administered was Macrobid on 4/10/25. No reported recent steroid.      Past Medical History:   Diagnosis Date    Diabetes mellitus (HCC)     Hyperlipidemia     Hypertension      Past Surgical History:   Procedure Laterality Date    CYST REMOVAL Right     HYSTERECTOMY (CERVIX STATUS UNKNOWN)         History reviewed. No pertinent family history.    Social History     Tobacco Use    Smoking status: Every Day     Current packs/day: 0.50     Types: Cigarettes    Smokeless tobacco: Never   Substance Use Topics    Alcohol use: No     Alcohol/week: 0.0 standard drinks of alcohol      Current Outpatient Medications   Medication Sig Dispense Refill    amLODIPine (NORVASC) 5 MG tablet 1 tablet      atorvastatin (LIPITOR) 10 MG tablet Take 1 tablet by mouth every evening      SPIRIVA

## 2025-05-13 NOTE — PATIENT INSTRUCTIONS
Urinary Tract Infection    - UA shows small leukocytes, and large blood. Other than that, it is unremarkable.   - Urine culture pending, will call once results are available.  - Keflex sent to the pharmacy, take as directed.  - OTC AZO as needed for pain, do not exceed 3 days of administration.  - May use daily cranberry supplement or juice to aide in bladder health.  - Alternate Tylenol/Motrin as needed.  - Increase fluid intake, especially water over the next 2-3 days.  - Avoid drinks that are carbonated or have caffeine. They can irritate the bladder.  - Urinate often. Try to empty your bladder each time.  - Avoid baths or hot tubs, especially bubble baths.   - Perform proper perineal hygiene by wiping front to back.  - Return to the clinic or follow up with PCP if symptoms worsen or fail to improve.

## 2025-05-15 LAB
BACTERIA UR CULT: ABNORMAL
BACTERIA UR CULT: ABNORMAL
ORGANISM: ABNORMAL

## 2025-05-16 ENCOUNTER — RESULTS FOLLOW-UP (OUTPATIENT)
Age: 75
End: 2025-05-16

## (undated) DEVICE — MASK,OXYGEN,MED CONC,ADLT,7' TUB, UC: Brand: PENDING

## (undated) DEVICE — Device: Brand: DEFENDO AIR/WATER/SUCTION AND BIOPSY VALVE

## (undated) DEVICE — A2000 MULTI-USE SYRINGE KIT, P/N 701277-003KIT CONTENTS: 100ML CONTRAST RESERVOIR AND TUBING WITH CONTRAST SPIKE AND CLAMP: Brand: A2000 MULTI-USE SYRINGE KIT

## (undated) DEVICE — CANN NASL ETCO2 LO/FLO A/

## (undated) DEVICE — TBG SMPL FLTR LINE NASL 02/C02 A/ BX/100

## (undated) DEVICE — CATH DIAG IMPULSE M/ PK 145 5FR

## (undated) DEVICE — PK CATH CARD 30 CA/4

## (undated) DEVICE — CUFF,BP,DISP,1 TUBE,ADULT,HP: Brand: MEDLINE

## (undated) DEVICE — GW STARTER FXD CORE J .035 3X260CM 3MM

## (undated) DEVICE — SOLIDIFIER LIQUI LOC PLUS 2000CC

## (undated) DEVICE — Device

## (undated) DEVICE — TR BAND RADIAL ARTERY COMPRESSION DEVICE: Brand: TR BAND

## (undated) DEVICE — SOL IRR NACL 0.9PCT BT 1000ML

## (undated) DEVICE — THE SINGLE USE ETRAP – POLYP TRAP IS USED FOR SUCTION RETRIEVAL OF ENDOSCOPICALLY REMOVED POLYPS.: Brand: ETRAP

## (undated) DEVICE — PAD, DEFIB, ADULT, RADIOTRANS, PHILIPS: Brand: MEDLINE

## (undated) DEVICE — THE CHANNEL CLEANING BRUSH IS A NYLON FLEXI BRUSH ATTACHED TO A FLEXIBLE PLASTIC SHEATH DESIGNED TO SAFELY REMOVE DEBRIS FROM FLEXIBLE ENDOSCOPES.

## (undated) DEVICE — MODEL BT2000 P/N 700287-012KIT CONTENTS: MANIFOLD WITH SALINE AND CONTRAST PORTS, SALINE TUBING WITH SPIKE AND HAND SYRINGE, TRANSDUCER: Brand: BT2000 AUTOMATED MANIFOLD KIT

## (undated) DEVICE — MODEL AT P65, P/N 701554-001KIT CONTENTS: HAND CONTROLLER, 3-WAY HIGH-PRESSURE STOPCOCK WITH ROTATING END AND PREMIUM HIGH-PRESSURE TUBING: Brand: ANGIOTOUCH® KIT

## (undated) DEVICE — SENSR O2 OXIMAX FNGR A/ 18IN NONSTR

## (undated) DEVICE — TIBURON BRACHIAL ANGIOGRAPHY DRAPE: Brand: CONVERTORS

## (undated) DEVICE — YANKAUER,BULB TIP WITH VENT: Brand: ARGYLE

## (undated) DEVICE — SNAR POLYP SENSATION MICRO OVL 13 240X40

## (undated) DEVICE — SUP ARMBRD ART/LINE BLU

## (undated) DEVICE — GLIDESHEATH SLENDER STAINLESS STEEL KIT: Brand: GLIDESHEATH SLENDER